# Patient Record
Sex: FEMALE | Race: BLACK OR AFRICAN AMERICAN | NOT HISPANIC OR LATINO | Employment: OTHER | URBAN - METROPOLITAN AREA
[De-identification: names, ages, dates, MRNs, and addresses within clinical notes are randomized per-mention and may not be internally consistent; named-entity substitution may affect disease eponyms.]

---

## 2019-05-24 PROBLEM — H40.1131 PRIMARY OPEN ANGLE GLAUCOMA OF BOTH EYES, MILD STAGE: Status: ACTIVE | Noted: 2019-05-24

## 2019-05-29 PROBLEM — G47.33 OSA (OBSTRUCTIVE SLEEP APNEA): Status: ACTIVE | Noted: 2019-05-29

## 2019-05-29 PROBLEM — Z91.199 POOR COMPLIANCE WITH CPAP TREATMENT: Status: ACTIVE | Noted: 2019-05-29

## 2019-05-29 PROBLEM — L40.50 PSORIATIC ARTHRITIS: Status: ACTIVE | Noted: 2019-05-29

## 2019-06-11 PROBLEM — I50.32 CHRONIC DIASTOLIC CONGESTIVE HEART FAILURE: Status: ACTIVE | Noted: 2019-06-11

## 2019-06-11 PROBLEM — B37.31 YEAST VAGINITIS: Status: ACTIVE | Noted: 2019-06-11

## 2019-06-11 PROBLEM — I25.118 CORONARY ARTERY DISEASE OF NATIVE ARTERY OF NATIVE HEART WITH STABLE ANGINA PECTORIS: Status: ACTIVE | Noted: 2019-06-11

## 2019-06-25 PROBLEM — L02.419 CELLULITIS AND ABSCESS OF LOWER EXTREMITY: Status: ACTIVE | Noted: 2019-06-25

## 2019-06-25 PROBLEM — L03.119 CELLULITIS AND ABSCESS OF LOWER EXTREMITY: Status: ACTIVE | Noted: 2019-06-25

## 2019-06-25 PROBLEM — I10 ESSENTIAL HYPERTENSION: Status: ACTIVE | Noted: 2019-06-25

## 2019-10-15 PROBLEM — M54.50 CHRONIC BILATERAL LOW BACK PAIN WITHOUT SCIATICA: Status: ACTIVE | Noted: 2019-10-15

## 2019-10-15 PROBLEM — G89.29 CHRONIC BILATERAL LOW BACK PAIN WITHOUT SCIATICA: Status: ACTIVE | Noted: 2019-10-15

## 2019-11-07 PROBLEM — N18.30 CKD (CHRONIC KIDNEY DISEASE), STAGE III: Status: ACTIVE | Noted: 2019-11-07

## 2019-11-08 ENCOUNTER — TELEPHONE (OUTPATIENT)
Dept: PHARMACY | Facility: CLINIC | Age: 64
End: 2019-11-08

## 2019-11-08 NOTE — TELEPHONE ENCOUNTER
LVM for callback to inform patient that [facility] received prescription for COSENTYX and prior authorization is required.  [facility] will be back in touch once insurance determination is received.

## 2019-11-14 NOTE — TELEPHONE ENCOUNTER
DOCUMENTATION ONLY:  Prior authorization for COSENTYX approved from 11/14/2019 to 12/31/2020.   Case ID# [id number]    Co-pay: $0    Patient Assistance IS required.     Forward to clinical pharmacist for consult & shipment.

## 2019-11-21 NOTE — TELEPHONE ENCOUNTER
Initial Cosentyx SensoReady Pens 150mg consult completed on . Cosentyx SensoReady Pens 150mg will be shipped on  to arrive at patient's home on  via [employer]. $0 copay. Patient will start Cosentyx SensoReady Pens 150mg next week after receiving shipment and feeling better. Address confirmed, CC on file. Confirmed 2 patient identifiers - name and . Therapy Appropriate.    - Cosentyx SensoReady Pens 150mg:  Inject 1 pen (150mg) every week x 5 weeks, then 1 pen (150mg) every 4 weeks.    -Storage: Refrigerate between 36-46 degrees Fahrenheit  Use within ONE HOUR of taking out of fridge.    -Injection technique:  - Wash hands before and after injection.  - Monthly RX will come with gauze, bandaids, and alcohol swabs.  - Patient may inject in either the tops of the thighs, abdomen- but at least 2 inches away from her belly button, or the outer part of her upper arm. Patient was instructed to rotate injections sites.  - Examine device to ensure no particulates, cloudiness, etc.  - Patient is to wipe down the injection site with the alcohol pad, wait to dry.  Gently squeeze the area of the cleaned skin and hold it firmly. Place the pen flat at a 90 degree angle against the raised area of skin that is being squeezed, and then push down firmly on the pen to start the injection. The 1st 'click' indicates the start and the second 'click' indicates that the injection is almost complete. A green indicator will fill the window when completed, and pen can be removed.  - Patient will use sharps container; once full, per [place] law, she may lock the sharps container and place in her trash. She can then contact the Pharmacy and we will replace the sharps at no additional charge.    -Potential Side effects:  Injection site reactions, diarrhea, cold like symptoms.  Patient advised to call if any signs of allergic reaction, infection, or use of live vaccines.    -DDI: Medication list reviewed and potential DDIs  addressed.  Patient verbalized understanding. Compliance stressed. Patient advised to keep a calendar marking dates of injections to ensure better compliance. Patient advised to call myself or provider should any questions arise. Patient plans to start Cosentyx SensoReady Pens next week after receiving shipment from [facility]. Consultation included: indication; goals of treatment; administration; storage and handling; side effects; how to handle side effects; the importance of compliance; how to handle missed doses; the importance of laboratory monitoring; the importance of keeping all follow up appointments. Patient understands to report any medication changes to [facility] and provider. All questions answered and addressed to patients satisfaction. I will f/u with her in 1 week from start, [facility] to contact patient in 3 weeks for refills.    Susie Torres, PharmD  [facility]  [phone]

## 2019-11-22 PROBLEM — H40.1132 PRIMARY OPEN ANGLE GLAUCOMA OF BOTH EYES, MODERATE STAGE: Status: ACTIVE | Noted: 2019-05-24

## 2019-12-19 ENCOUNTER — TELEPHONE (OUTPATIENT)
Dept: PHARMACY | Facility: CLINIC | Age: 64
End: 2019-12-19

## 2020-01-31 ENCOUNTER — TELEPHONE (OUTPATIENT)
Dept: PHARMACY | Facility: CLINIC | Age: 65
End: 2020-01-31

## 2020-02-28 ENCOUNTER — TELEPHONE (OUTPATIENT)
Dept: PHARMACY | Facility: CLINIC | Age: 65
End: 2020-02-28

## 2020-03-13 NOTE — TELEPHONE ENCOUNTER
DOCUMENTATION ONLY:  Prior authorization for Cosentyx approved from 03/12/20 to 12/31/20    Co-pay: $3.90    Patient Assistance IS NOT

## 2020-03-26 ENCOUNTER — TELEPHONE (OUTPATIENT)
Dept: PHARMACY | Facility: CLINIC | Age: 65
End: 2020-03-26

## 2020-04-28 ENCOUNTER — TELEPHONE (OUTPATIENT)
Dept: PHARMACY | Facility: CLINIC | Age: 65
End: 2020-04-28

## 2020-04-30 NOTE — TELEPHONE ENCOUNTER
RX call attempt 2 regarding Cosentyx refill from [facility]. Patient was not reached, left voicemail. $0 copay. RBA

## 2020-05-20 PROBLEM — B35.1 FUNGAL TOENAIL INFECTION: Status: ACTIVE | Noted: 2020-05-20

## 2020-05-21 ENCOUNTER — TELEPHONE (OUTPATIENT)
Dept: PHARMACY | Facility: CLINIC | Age: 65
End: 2020-05-21

## 2020-05-21 NOTE — TELEPHONE ENCOUNTER
Call attempt 1 LVM 5/21/20 for Cosentyx refill/follow up. Unable to send Asteriskhart message. ($0 copay in 004).

## 2020-05-21 NOTE — TELEPHONE ENCOUNTER
Confirmed Cosentyx shipment  to arrive to patient home . Patient stated that she was advised to continue Cosentyx. She reported that she is to take half of her MTX dose next week and then discontinue MTX. She was advised to continue Arava - confirmed in OV summary. Address and  verified. $0 copay (004).

## 2020-05-22 PROBLEM — E11.3293 MILD NONPROLIFERATIVE DIABETIC RETINOPATHY OF BOTH EYES WITHOUT MACULAR EDEMA ASSOCIATED WITH TYPE 2 DIABETES MELLITUS: Status: ACTIVE | Noted: 2020-05-22

## 2020-06-24 ENCOUNTER — TELEPHONE (OUTPATIENT)
Dept: PHARMACY | Facility: CLINIC | Age: 65
End: 2020-06-24

## 2020-07-07 ENCOUNTER — TELEPHONE (OUTPATIENT)
Dept: PHARMACY | Facility: CLINIC | Age: 65
End: 2020-07-07

## 2020-08-09 PROBLEM — B37.31 YEAST VAGINITIS: Status: RESOLVED | Noted: 2019-06-11 | Resolved: 2020-08-09

## 2020-08-20 ENCOUNTER — TELEPHONE (OUTPATIENT)
Dept: PHARMACY | Facility: CLINIC | Age: 65
End: 2020-08-20

## 2020-08-20 NOTE — TELEPHONE ENCOUNTER
LVM with patient on 8/20 for Cosentyx refill and follow up. $0 copay (004). Unable to send Framed Data

## 2020-08-24 ENCOUNTER — TELEPHONE (OUTPATIENT)
Dept: PHARMACY | Facility: CLINIC | Age: 65
End: 2020-08-24

## 2020-09-08 PROBLEM — L28.0 LICHEN SIMPLEX CHRONICUS: Status: ACTIVE | Noted: 2020-09-08

## 2020-09-19 PROBLEM — I24.9 ACS (ACUTE CORONARY SYNDROME): Status: ACTIVE | Noted: 2020-09-19

## 2020-09-19 PROBLEM — R07.89 OTHER CHEST PAIN: Status: ACTIVE | Noted: 2020-09-19

## 2020-09-19 PROBLEM — I10 ESSENTIAL HYPERTENSION: Status: RESOLVED | Noted: 2019-06-25 | Resolved: 2020-09-19

## 2020-09-19 PROBLEM — R07.89 OTHER CHEST PAIN: Status: RESOLVED | Noted: 2020-09-19 | Resolved: 2020-09-19

## 2020-09-20 PROBLEM — I50.30 HEART FAILURE WITH PRESERVED EJECTION FRACTION: Status: ACTIVE | Noted: 2019-06-11

## 2020-09-20 PROBLEM — E11.9 TYPE 2 DIABETES MELLITUS, WITH LONG-TERM CURRENT USE OF INSULIN: Status: ACTIVE | Noted: 2020-09-20

## 2020-09-20 PROBLEM — N18.30 CKD (CHRONIC KIDNEY DISEASE), STAGE III: Status: RESOLVED | Noted: 2019-11-07 | Resolved: 2020-09-20

## 2020-09-20 PROBLEM — Z79.4 TYPE 2 DIABETES MELLITUS, WITH LONG-TERM CURRENT USE OF INSULIN: Status: ACTIVE | Noted: 2020-09-20

## 2020-09-20 PROBLEM — R07.9 CHEST PAIN: Status: ACTIVE | Noted: 2020-09-19

## 2020-09-24 ENCOUNTER — TELEPHONE (OUTPATIENT)
Dept: PHARMACY | Facility: CLINIC | Age: 65
End: 2020-09-24

## 2020-09-24 NOTE — TELEPHONE ENCOUNTER
Cosentyx refill confirmed. Per provider, patient to inject per usual post PCI procedure. Cosentyx will ship 9/28 for delivery 9/29 $0.00 -004.

## 2020-10-16 ENCOUNTER — TELEPHONE (OUTPATIENT)
Dept: PHARMACY | Facility: CLINIC | Age: 65
End: 2020-10-16

## 2020-12-03 ENCOUNTER — TELEPHONE (OUTPATIENT)
Dept: PHARMACY | Facility: CLINIC | Age: 65
End: 2020-12-03

## 2020-12-09 ENCOUNTER — SPECIALTY PHARMACY (OUTPATIENT)
Dept: PHARMACY | Facility: CLINIC | Age: 65
End: 2020-12-09

## 2020-12-09 NOTE — TELEPHONE ENCOUNTER
"Specialty Pharmacy - Clinical Reassessment  Specialty Pharmacy - Refill Coordination    Specialty Medication Orders Linked to Encounter      Most Recent Value   Medication #1  secukinumab (COSENTYX PEN) 150 mg/mL PnIj (Order#[id number], Rx#[id number])        Moses Arnold is a 65 y.o. female, who is followed by the specialty pharmacy service for management and education.    Recent Encounters     Date Type Provider Description    12/09/2020 Specialty Pharmacy Jazzmine Brantley PharmD Follow-up Clinical Reassessment; Refill Coordination        Clinical call attempts since last clinical assessment   No call attempts found.     Today she received follow up education for her specialty medication(s).    Current Outpatient Medications   Medication Sig    amitriptyline (ELAVIL) 50 MG tablet TK 1 T PO NIGHTLY    amLODIPine (NORVASC) 10 MG tablet Take 1 tablet (10 mg total) by mouth once daily.    ammonium lactate 12 % Crea APPLY EVERY DAY TO FEET    aspirin (ECOTRIN) 81 MG EC tablet Take 1 tablet (81 mg total) by mouth once daily.    atorvastatin (LIPITOR) 80 MG tablet Take 1 tablet (80 mg total) by mouth every evening.    BD ULTRA-FINE SHORT PEN NEEDLE 31 gauge x 5/16" Ndle U UTD    blood sugar diagnostic (ACCU-CHEK TALI PLUS TEST STRP) Strp 1 strip by Misc.(Non-Drug; Combo Route) route 2 (two) times daily before meals. Dx Code E 11.9   DXT BID   IDDM  Accu-Chek Tali Pluse Test Strips    blood-glucose meter (FREESTYLE SYSTEM KIT) kit Use as instructed    carvediloL (COREG) 12.5 MG tablet Take 1 tablet (12.5 mg total) by mouth 2 (two) times daily with meals.    ciclopirox (PENLAC) 8 % Soln     clopidogreL (PLAVIX) 75 mg tablet Take 1 tablet (75 mg total) by mouth once daily.    folic acid (FOLVITE) 1 MG tablet Take 1 tablet (1 mg total) by mouth once daily.    furosemide (LASIX) 40 MG tablet Take 1 tablet (40 mg total) by mouth once daily.    gabapentin (NEURONTIN) 300 MG capsule Take 1 " capsule (300 mg total) by mouth 3 (three) times daily.    insulin glargine (LANTUS U-100 INSULIN) 100 unit/mL injection Inject 58 Units into the skin every evening.    insulin regular hum U-500 conc 500 unit/mL (3 mL) InPn Inject 15 Units into the skin 3 (three) times daily before meals.    isosorbide mononitrate (IMDUR) 30 MG 24 hr tablet Take 3 tablets (90 mg total) by mouth once daily.    leflunomide (ARAVA) 10 MG Tab Take 1 tablet (10 mg total) by mouth once daily. TK 1 T PO D    leflunomide (ARAVA) 20 MG Tab     lisinopriL (PRINIVIL,ZESTRIL) 40 MG tablet Take 1 tablet (40 mg total) by mouth once daily.    metFORMIN (GLUCOPHAGE XR) 500 MG XR 24hr tablet Take 1 tablet (500 mg total) by mouth once daily.    nitroGLYCERIN (NITROSTAT) 0.4 MG SL tablet Place 1 tablet (0.4 mg total) under the tongue every 5 (five) minutes as needed for Chest pain.    secukinumab (COSENTYX PEN) 150 mg/mL PnIj Inject 150 mg into the skin every 28 days.    terbinafine HCL (LAMISIL) 250 mg tablet     timolol maleate 0.5% (TIMOPTIC) 0.5 % Drop Place 1 drop into both eyes 2 (two) times daily.    traMADol (ULTRAM) 50 mg tablet Take 1 tablet (50 mg total) by mouth every 12 (twelve) hours as needed for Pain.    triamcinolone (KENALOG) 0.5 % ointment Apply topically 3 (three) times daily.   Last reviewed on 12/8/2020  9:37 AM by Dora Fay MD    Review of patient's allergies indicates:  No Known AllergiesLast reviewed on  12/9/2020 9:47 AM by Jazzmine Brantley    Drug Interactions    Drug interactions evaluated: yes  Clinically relevant drug interactions identified: no  Provided the patient with educational material regarding drug interactions: not applicable       Medication Adherence    Patient reported X missed doses in the last month: 0  Any gaps in refill history greater than 2 weeks in the last 3 months: no  Informant: patient  Reliability of informant: reliable  Provider-estimated medication adherence level:  "%  Reasons for non-adherence: no problems identified  Adherence tools used: calendar  Confirmed plan for next specialty medication refill: delivery by pharmacy  Refills needed for supportive medications: not needed       Adverse Effects    *All other systems reviewed and are negative       Assessment Questions - Documented Responses      Most Recent Value   Assessment   Medication Reconciliation completed for patient  Yes   During the past 4 weeks, has patient missed any activities due to condition or medication?  No   During the past 4 weeks, did patient have any of the following urgent care visits?  None   Goals of Therapy Status  Achieving   Welcome packet contents reviewed and discussed with patient?  No   Assesment completed?  Yes   Plan  Therapy continued   Do you need to open a clinical intervention (i-vent)?  No   Do you want to schedule first shipment?  No   Medication #1 Assessment Info   Patient status  Existing medication, Exisiting to [facility]   Is this medication appropriate for the patient?  Yes   Is this medication effective?  Yes        Objective    She has a past medical history of Cataract, Diabetes mellitus, Glaucoma, and Mild nonproliferative diabetic retinopathy of both eyes without macular edema associated with type 2 diabetes mellitus (5/22/2020).    Tried/failed medications: Humira, Enbrel, MTX, Arava, topicals    BP Readings from Last 4 Encounters:   12/08/20 112/69   11/06/20 (!) 144/66   10/14/20 128/68   09/22/20 124/66     Ht Readings from Last 4 Encounters:   12/08/20 5' 6" (1.676 m)   11/06/20 5' 6" (1.676 m)   10/14/20 5' 6" (1.676 m)   09/21/20 5' 6" (1.676 m)     Wt Readings from Last 4 Encounters:   12/08/20 115.7 kg (255 lb)   11/06/20 111.6 kg (246 lb 1.6 oz)   10/14/20 114 kg (251 lb 6.4 oz)   09/21/20 108.9 kg (240 lb)     Recent Labs   Lab Result Units 12/08/20  1019 09/22/20  0734 09/21/20  0120 09/20/20  0440 09/19/20  1356   Creatinine mg/dL 1.50 H 1.20 1.30 1.10 1.60 H "   ALT U/L 11  --   --   --  44   AST U/L 15  --   --   --  17     The goals of prescribed drug therapy management include:  · Supporting patient to meet the prescriber's medical treatment objectives  · Improving or maintaining quality of life  · Maintaining optimal therapy adherence  · Minimizing and managing side effects    Goals of Therapy Status: Achieving    Assessment/Plan  Patient plans to continue therapy without changes    Indication, dosage, appropriateness, effectiveness, safety and convenience of her specialty medication(s) were reviewed today.     Patient Counseling    Counseled the patient on the following: doses and administration discussed, safe handling, storage, and disposal discussed, possible adverse effects and management discussed, possible drug and prescription drug interactions discussed, possible drug and OTC drug and food interactions discussed, lab monitoring and follow-up discussed, use of contraception discussed, therapeutic rationale discussed, cost of medications and cost implications discussed, adherence and missed doses discussed, pharmacy contact information discussed       Cosentyx reassessment complete. Next injection due 12/10 - pt delayed most recent injection due to no refills and MD appt. Will ship 12/9 to arrive 12/10 via [employer]. Copay $0, patient is not in need of a sharps container. Pt is very comfortable with the injection process. She  injects in her thighs and rotates injection sites. Pt denies missed doses and uses a calendar to remember injection dates. Pt stores in the refrigerator and reminded that Cosentyx is only stable at RT for 1 hr. Pt denies side effects and reminded to hold medication if ill. Patient declined full medication reconciliation as it was completed at [facility] yesterday 12/8/20. Drug interaction check revealed no clinically significant DDI's identified. Comorbidities reviewed. DM - patient states she takes her BG daily and it typically runs around 120.  Patient states that she is stable on insulin and metformin, most recent a1c 9.1% 9/21/20 patient advised of importance of keep diabetes controlled. HTN - patient states that her BP has been very well controlled on both amlodipine and lisinopril.  Allergies reviewed. Labs reviewed (12/8/20) - CBC stable, LFTs WNL, TB/Hep B negative 9/19/20 . Therapy appropriate to continue. PsA symptoms reviewed - patient states that Cosentyx works wonderfully for her. She has noticed a huge improvement and at this point denied any joint pain or symptoms associated with her PsA.  Pt denies any missed plans or trips to the ER/urgent care in the last month. Pt rates pain at 0/10 and overall health at 6.5/10 (10 being the best). Next appt 7/6/21 pt advised to keep up with all labs an appts. [facility] will follow up in 180 days - patient has been stable on therapy for over 1 year. Pt had no further questions.     Tasks added this encounter   5/31/2021 - Clinical - Follow Up Assesement (180 day)  12/31/2020 - Refill Call (Auto Added)   Tasks due within next 3 months   No tasks due.     Jazzmine Brantley, PharmD  [facility]  [address] [place] [address]  Phone: [phone]  Fax: [phone]

## 2021-01-22 ENCOUNTER — SPECIALTY PHARMACY (OUTPATIENT)
Dept: PHARMACY | Facility: CLINIC | Age: 66
End: 2021-01-22

## 2021-05-05 ENCOUNTER — PATIENT OUTREACH (OUTPATIENT)
Dept: ADMINISTRATIVE | Facility: HOSPITAL | Age: 66
End: 2021-05-05

## 2021-05-05 DIAGNOSIS — E11.65 TYPE 2 DIABETES MELLITUS WITH HYPERGLYCEMIA, WITH LONG-TERM CURRENT USE OF INSULIN: ICD-10-CM

## 2021-05-05 DIAGNOSIS — Z79.4 TYPE 2 DIABETES MELLITUS WITH HYPERGLYCEMIA, WITH LONG-TERM CURRENT USE OF INSULIN: ICD-10-CM

## 2021-05-05 DIAGNOSIS — Z12.31 SCREENING MAMMOGRAM, ENCOUNTER FOR: ICD-10-CM

## 2021-05-05 DIAGNOSIS — Z12.31 ENCOUNTER FOR MAMMOGRAM TO ESTABLISH BASELINE MAMMOGRAM: Primary | ICD-10-CM

## 2021-07-07 ENCOUNTER — SPECIALTY PHARMACY (OUTPATIENT)
Dept: PHARMACY | Facility: CLINIC | Age: 66
End: 2021-07-07

## 2021-07-08 ENCOUNTER — SPECIALTY PHARMACY (OUTPATIENT)
Dept: PHARMACY | Facility: CLINIC | Age: 66
End: 2021-07-08

## 2021-07-08 DIAGNOSIS — L40.50 PSORIATIC ARTHRITIS: Primary | ICD-10-CM

## 2021-07-29 ENCOUNTER — SPECIALTY PHARMACY (OUTPATIENT)
Dept: PHARMACY | Facility: CLINIC | Age: 66
End: 2021-07-29

## 2021-08-27 ENCOUNTER — SPECIALTY PHARMACY (OUTPATIENT)
Dept: PHARMACY | Facility: CLINIC | Age: 66
End: 2021-08-27

## 2021-10-07 ENCOUNTER — SPECIALTY PHARMACY (OUTPATIENT)
Dept: PHARMACY | Facility: CLINIC | Age: 66
End: 2021-10-07

## 2021-11-03 PROBLEM — M47.816 LUMBAR SPONDYLOSIS: Status: ACTIVE | Noted: 2021-11-03

## 2021-11-10 ENCOUNTER — SPECIALTY PHARMACY (OUTPATIENT)
Dept: PHARMACY | Facility: CLINIC | Age: 66
End: 2021-11-10

## 2021-12-08 ENCOUNTER — SPECIALTY PHARMACY (OUTPATIENT)
Dept: PHARMACY | Facility: CLINIC | Age: 66
End: 2021-12-08

## 2022-01-04 ENCOUNTER — SPECIALTY PHARMACY (OUTPATIENT)
Dept: PHARMACY | Facility: CLINIC | Age: 67
End: 2022-01-04

## 2022-01-04 PROBLEM — E66.01 SEVERE OBESITY (BMI >= 40): Status: ACTIVE | Noted: 2022-01-04

## 2022-01-04 PROBLEM — E87.5 HYPERKALEMIA: Status: ACTIVE | Noted: 2022-01-04

## 2022-01-04 PROBLEM — E11.65 UNCONTROLLED TYPE 2 DIABETES MELLITUS WITH HYPERGLYCEMIA: Status: ACTIVE | Noted: 2022-01-04

## 2022-01-04 NOTE — TELEPHONE ENCOUNTER
Specialty Pharmacy - Refill Coordination    Specialty Medication Orders Linked to Encounter    Flowsheet Row Most Recent Value   Medication #1 secukinumab (COSENTYX PEN) 150 mg/mL PnIj (Order#[id number], Rx#[id number])          Refill Questions - Documented Responses    Flowsheet Row Most Recent Value   Patient Availability and HIPAA Verification    Does patient want to proceed with activity? Yes   HIPAA/medical authority confirmed? Yes   Relationship to patient of person spoken to? Self   Refill Screening Questions    Changes to allergies? No   Changes to medications? No   New conditions since last clinic visit? No   Unplanned office visit, urgent care, ED, or hospital admission in the last 4 weeks? No   How does patient/caregiver feel medication is working? Excellent   Financial problems or insurance changes? No   How many doses of your specialty medications were missed in the last 4 weeks? 0   Would patient like to speak to a pharmacist? No   When does the patient need to receive the medication? 01/11/22   Refill Delivery Questions    How will the patient receive the medication? Mail   When does the patient need to receive the medication? 01/11/22   Shipping Address Home   Address in [facility] confirmed and updated if neccessary? Yes   Expected Copay ($) 4   Is the patient able to afford the medication copay? Yes   Payment Method CC on file   Days supply of Refill 28   Supplies needed? No supplies needed   Refill activity completed? Yes   Refill activity plan Refill scheduled   Shipment/Pickup Date: 01/06/22          Current Outpatient Medications   Medication Sig    ACCU-CHEK FRIEDA PLUS METER Misc Inject 1 each into the skin 2 (two) times a day.    amitriptyline (ELAVIL) 50 MG tablet TK 1 T PO NIGHTLY    ammonium lactate 12 % Crea APPLY EVERY DAY TO FEET    aspirin (ECOTRIN) 81 MG EC tablet Take 1 tablet (81 mg total) by mouth once daily.    atorvastatin (LIPITOR) 80 MG tablet Take 1 tablet (80 mg  "total) by mouth every evening.    BD ULTRA-FINE SHORT PEN NEEDLE 31 gauge x 5/16" Ndle U UTD    blood sugar diagnostic (ACCU-CHEK FRIEDA PLUS TEST STRP) Strp 1 strip by Misc.(Non-Drug; Combo Route) route 2 (two) times daily before meals. Dx Code E 11.9   DXT BID   IDDM  Accu-Chek Frieda Pluse Test Strips    blood sugar diagnostic Strp 1 each by Misc.(Non-Drug; Combo Route) route 4 (four) times daily.    carvediloL (COREG) 12.5 MG tablet Take 2 tablets (25 mg total) by mouth 2 (two) times daily with meals.    clopidogreL (PLAVIX) 75 mg tablet Take 1 tablet (75 mg total) by mouth once daily.    diclofenac sodium (VOLTAREN) 1 % Gel Apply 2 g topically once daily.    DULoxetine (CYMBALTA) 30 MG capsule Take 1 capsule (30 mg total) by mouth once daily.    furosemide (LASIX) 40 MG tablet Take 1 tablet (40 mg total) by mouth once daily.    gabapentin (NEURONTIN) 300 MG capsule Take 1 capsule (300 mg total) by mouth 3 (three) times daily.    HUMULIN R U-500, CONC, INSULIN 500 unit/mL Soln     insulin glargine (LANTUS U-100 INSULIN) 100 unit/mL injection Inject 60 Units into the skin every evening.    insulin regular hum U-500 conc (HUMULIN U-500) 500 unit/mL (3 mL) insulin pen Inject 15 Units into the skin 3 (three) times daily before meals.    isosorbide mononitrate (IMDUR) 30 MG 24 hr tablet Take 3 tablets (90 mg total) by mouth once daily.    lancing device Misc 1 each by Misc.(Non-Drug; Combo Route) route 4 (four) times daily.    lisinopriL (PRINIVIL,ZESTRIL) 40 MG tablet Take 1 tablet (40 mg total) by mouth once daily.    MICRO THIN LANCETS 33 gauge Misc Inject 300 lancets into the skin 2 (two) times a day.    NIFEdipine (PROCARDIA-XL) 60 MG (OSM) 24 hr tablet Take 1 tablet (60 mg total) by mouth 2 (two) times a day.    nitroGLYCERIN (NITROSTAT) 0.4 MG SL tablet Place 1 tablet (0.4 mg total) under the tongue every 5 (five) minutes as needed for Chest pain.    secukinumab (COSENTYX PEN) 150 mg/mL EricIj " Inject 150 mg into the skin every 28 days.    SUPER THIN LANCETS 28 gauge Misc USE AS DIRECTED FOUR TIMES DAILY    timolol maleate 0.5% (TIMOPTIC) 0.5 % Drop Place 1 drop into both eyes 2 (two) times daily.    triamcinolone (KENALOG) 0.5 % ointment Apply topically 3 (three) times daily.   Last reviewed on 1/4/2022  9:33 AM by Kristin Beck MA    Review of patient's allergies indicates:  No Known Allergies Last reviewed on  1/4/2022 9:32 AM by Kristin Beck      Tasks added this encounter   No tasks added.   Tasks due within next 3 months   1/4/2022 - Refill Call (Auto Added)     Al PharmD  [facility]  [address] [place], [place] [address]  [facility] [address]  Phone: [phone]  Fax: [phone]

## 2022-01-06 PROBLEM — E87.5 HYPERKALEMIA: Status: RESOLVED | Noted: 2022-01-04 | Resolved: 2022-01-06

## 2022-01-22 PROBLEM — M48.061 SPINAL STENOSIS OF LUMBAR REGION WITHOUT NEUROGENIC CLAUDICATION: Status: ACTIVE | Noted: 2022-01-22

## 2022-02-02 ENCOUNTER — SPECIALTY PHARMACY (OUTPATIENT)
Dept: PHARMACY | Facility: CLINIC | Age: 67
End: 2022-02-02

## 2022-02-02 NOTE — TELEPHONE ENCOUNTER
Specialty Pharmacy - Refill Coordination    Specialty Medication Orders Linked to Encounter    Flowsheet Row Most Recent Value   Medication #1 secukinumab (COSENTYX PEN) 150 mg/mL PnIj (Order#[id number], Rx#[id number])          Refill Questions - Documented Responses    Flowsheet Row Most Recent Value   Patient Availability and HIPAA Verification    Does patient want to proceed with activity? Yes   HIPAA/medical authority confirmed? Yes   Relationship to patient of person spoken to? Self   Refill Screening Questions    Changes to allergies? No   Changes to medications? No   New conditions since last clinic visit? No   Unplanned office visit, urgent care, ED, or hospital admission in the last 4 weeks? No   How does patient/caregiver feel medication is working? Good   Financial problems or insurance changes? No   How many doses of your specialty medications were missed in the last 4 weeks? 0   Would patient like to speak to a pharmacist? No   When does the patient need to receive the medication? 02/11/22   Refill Delivery Questions    How will the patient receive the medication? Mail   When does the patient need to receive the medication? 02/11/22   Shipping Address Home   Address in [facility] confirmed and updated if neccessary? Yes   Expected Copay ($) 4   Is the patient able to afford the medication copay? Yes   Payment Method CC on file   Days supply of Refill 28   Supplies needed? No supplies needed   Refill activity completed? Yes   Refill activity plan Refill scheduled   Shipment/Pickup Date: 02/09/22          Current Outpatient Medications   Medication Sig    ACCU-CHEK FRIEDA PLUS METER Misc Inject 1 each into the skin 2 (two) times a day.    acetaminophen (TYLENOL) 325 MG tablet Take 2 tablets (650 mg total) by mouth every 6 (six) hours as needed.    amitriptyline (ELAVIL) 50 MG tablet TK 1 T PO NIGHTLY    ammonium lactate 12 % Crea APPLY EVERY DAY TO FEET    aspirin (ECOTRIN) 81 MG EC tablet Take  "1 tablet (81 mg total) by mouth once daily.    atorvastatin (LIPITOR) 80 MG tablet Take 1 tablet (80 mg total) by mouth every evening.    BD ULTRA-FINE SHORT PEN NEEDLE 31 gauge x 5/16" Ndle U UTD    blood sugar diagnostic (ACCU-CHEK FRIEDA PLUS TEST STRP) Strp 1 strip by Misc.(Non-Drug; Combo Route) route 2 (two) times daily before meals. Dx Code E 11.9   DXT BID   IDDM  Accu-Chek Frieda Pluse Test Strips    blood sugar diagnostic Strp 1 each by Misc.(Non-Drug; Combo Route) route 4 (four) times daily.    carvediloL (COREG) 12.5 MG tablet Take 2 tablets (25 mg total) by mouth 2 (two) times daily with meals.    clopidogreL (PLAVIX) 75 mg tablet Take 1 tablet (75 mg total) by mouth once daily.    diclofenac sodium (VOLTAREN) 1 % Gel Apply 2 g topically once daily.    DULoxetine (CYMBALTA) 30 MG capsule Take 1 capsule (30 mg total) by mouth once daily.    furosemide (LASIX) 40 MG tablet Take 1 tablet (40 mg total) by mouth once daily.    gabapentin (NEURONTIN) 300 MG capsule Take 1 capsule (300 mg total) by mouth 3 (three) times daily.    insulin aspart U-100 (NOVOLOG) 100 unit/mL injection Inject 10 Units into the skin 3 (three) times daily.    insulin glargine (LANTUS U-100 INSULIN) 100 unit/mL injection Inject 30 Units into the skin 2 (two) times daily.    isosorbide mononitrate (IMDUR) 30 MG 24 hr tablet Take 3 tablets (90 mg total) by mouth once daily.    lancing device Misc 1 each by Misc.(Non-Drug; Combo Route) route 4 (four) times daily.    LIDOcaine (LIDODERM) 5 % Place 1 patch onto the skin once daily. Remove & Discard patch within 12 hours or as directed by MD    lisinopriL (PRINIVIL,ZESTRIL) 40 MG tablet Take 1 tablet (40 mg total) by mouth once daily.    MICRO THIN LANCETS 33 gauge Misc Inject 300 lancets into the skin 2 (two) times a day.    nitroGLYCERIN (NITROSTAT) 0.4 MG SL tablet Place 1 tablet (0.4 mg total) under the tongue every 5 (five) minutes as needed for Chest pain.    " secukinumab (COSENTYX PEN) 150 mg/mL PnIj Inject 150 mg into the skin every 28 days.    SUPER THIN LANCETS 28 gauge Misc USE AS DIRECTED FOUR TIMES DAILY    timolol maleate 0.5% (TIMOPTIC) 0.5 % Drop Place 1 drop into both eyes 2 (two) times daily.    triamcinolone (KENALOG) 0.5 % ointment Apply topically 3 (three) times daily.   Last reviewed on 1/22/2022 10:05 AM by La Fowler MA    Review of patient's allergies indicates:  No Known Allergies Last reviewed on  1/22/2022 10:05 AM by La Fowler      Tasks added this encounter   No tasks added.   Tasks due within next 3 months   2/1/2022 - Refill Call (Auto Added)     [facility]  [address] [place], [place] [address]  [facility] [address]  Phone: [phone]  Fax: [phone]

## 2022-02-18 ENCOUNTER — EXTERNAL HOME HEALTH (OUTPATIENT)
Dept: HOME HEALTH SERVICES | Facility: HOSPITAL | Age: 67
End: 2022-02-18

## 2022-03-04 ENCOUNTER — SPECIALTY PHARMACY (OUTPATIENT)
Dept: PHARMACY | Facility: CLINIC | Age: 67
End: 2022-03-04

## 2022-03-04 NOTE — TELEPHONE ENCOUNTER
Specialty Pharmacy - Refill Coordination    Specialty Medication Orders Linked to Encounter    Flowsheet Row Most Recent Value   Medication #1 secukinumab (COSENTYX PEN) 150 mg/mL PnIj (Order#[id number], Rx#[id number])          Refill Questions - Documented Responses    Flowsheet Row Most Recent Value   Patient Availability and HIPAA Verification    Does patient want to proceed with activity? Yes   HIPAA/medical authority confirmed? Yes   Relationship to patient of person spoken to? Self   Refill Screening Questions    Changes to allergies? No   Changes to medications? No   New conditions since last clinic visit? No   Unplanned office visit, urgent care, ED, or hospital admission in the last 4 weeks? No   How does patient/caregiver feel medication is working? Good   Financial problems or insurance changes? No   How many doses of your specialty medications were missed in the last 4 weeks? 0   Would patient like to speak to a pharmacist? No   When does the patient need to receive the medication? 03/11/22   Refill Delivery Questions    How will the patient receive the medication? Mail   When does the patient need to receive the medication? 03/11/22   Shipping Address Home   Address in [facility] confirmed and updated if neccessary? Yes   Expected Copay ($) 0   Is the patient able to afford the medication copay? Yes   Payment Method zero copay   Days supply of Refill 28   Supplies needed? No supplies needed   Refill activity completed? Yes   Refill activity plan Refill scheduled   Shipment/Pickup Date: 03/08/22          Current Outpatient Medications   Medication Sig    ACCU-CHEK FRIEDA PLUS METER Misc Inject 1 each into the skin 2 (two) times a day.    acetaminophen (TYLENOL) 325 MG tablet Take 2 tablets (650 mg total) by mouth every 6 (six) hours as needed.    amitriptyline (ELAVIL) 50 MG tablet TK 1 T PO NIGHTLY    ammonium lactate 12 % Crea APPLY EVERY DAY TO FEET    aspirin (ECOTRIN) 81 MG EC tablet Take  "1 tablet (81 mg total) by mouth once daily.    atorvastatin (LIPITOR) 80 MG tablet Take 1 tablet (80 mg total) by mouth every evening.    BD ULTRA-FINE SHORT PEN NEEDLE 31 gauge x 5/16" Ndle U UTD    blood sugar diagnostic (ACCU-CHEK FRIEDA PLUS TEST STRP) Strp 1 strip by Misc.(Non-Drug; Combo Route) route 2 (two) times daily before meals. Dx Code E 11.9   DXT BID   IDDM  Accu-Chek Frieda Pluse Test Strips    blood sugar diagnostic Strp 1 each by Misc.(Non-Drug; Combo Route) route 4 (four) times daily.    carvediloL (COREG) 12.5 MG tablet Take 2 tablets (25 mg total) by mouth 2 (two) times daily with meals.    clopidogreL (PLAVIX) 75 mg tablet Take 1 tablet (75 mg total) by mouth once daily.    diclofenac sodium (VOLTAREN) 1 % Gel Apply 2 g topically once daily.    DULoxetine (CYMBALTA) 30 MG capsule Take 1 capsule (30 mg total) by mouth once daily.    furosemide (LASIX) 40 MG tablet Take 1 tablet (40 mg total) by mouth once daily.    gabapentin (NEURONTIN) 300 MG capsule Take 1 capsule (300 mg total) by mouth 3 (three) times daily.    HUMULIN R U-500, CONC, KWIKPEN 500 unit/mL (3 mL) insulin pen     insulin aspart U-100 (NOVOLOG) 100 unit/mL injection Inject 10 Units into the skin 3 (three) times daily.    insulin glargine (LANTUS U-100 INSULIN) 100 unit/mL injection Inject 30 Units into the skin 2 (two) times daily.    isosorbide mononitrate (IMDUR) 30 MG 24 hr tablet Take 3 tablets (90 mg total) by mouth once daily.    lancing device Misc 1 each by Misc.(Non-Drug; Combo Route) route 4 (four) times daily.    LIDOcaine (LIDODERM) 5 % Place 1 patch onto the skin once daily. Remove & Discard patch within 12 hours or as directed by MD    lisinopriL (PRINIVIL,ZESTRIL) 40 MG tablet Take 1 tablet (40 mg total) by mouth once daily.    MICRO THIN LANCETS 33 gauge Misc Inject 300 lancets into the skin 2 (two) times a day.    nitroGLYCERIN (NITROSTAT) 0.4 MG SL tablet Place 1 tablet (0.4 mg total) under the " tongue every 5 (five) minutes as needed for Chest pain.    secukinumab (COSENTYX PEN) 150 mg/mL PnIj Inject 150 mg into the skin every 28 days.    SUPER THIN LANCETS 28 gauge Misc USE AS DIRECTED FOUR TIMES DAILY    timolol maleate 0.5% (TIMOPTIC) 0.5 % Drop Place 1 drop into both eyes 2 (two) times daily.    triamcinolone (KENALOG) 0.5 % ointment Apply topically 3 (three) times daily.   Last reviewed on 3/1/2022  1:09 PM by Mia Traecy MA    Review of patient's allergies indicates:  No Known Allergies Last reviewed on  3/1/2022 1:08 PM by Mia Tracey      Tasks added this encounter   4/1/2022 - Refill Call (Auto Added)   Tasks due within next 3 months   No tasks due.     Phyllis Powell [facility] - [facility]  [address] [place] [address]  Phone: [phone]  Fax: [phone]

## 2022-04-01 ENCOUNTER — SPECIALTY PHARMACY (OUTPATIENT)
Dept: PHARMACY | Facility: CLINIC | Age: 67
End: 2022-04-01

## 2022-04-01 PROBLEM — H04.123 DRY EYE SYNDROME, BILATERAL: Status: ACTIVE | Noted: 2022-04-01

## 2022-04-01 NOTE — TELEPHONE ENCOUNTER
Specialty Pharmacy - Refill Coordination    Specialty Medication Orders Linked to Encounter    Flowsheet Row Most Recent Value   Medication #1 secukinumab (COSENTYX PEN) 150 mg/mL PnIj (Order#[id number], Rx#[id number])          Refill Questions - Documented Responses    Flowsheet Row Most Recent Value   Patient Availability and HIPAA Verification    Does patient want to proceed with activity? Yes   HIPAA/medical authority confirmed? Yes   Relationship to patient of person spoken to? Self   Refill Screening Questions    Changes to allergies? No   Changes to medications? No   New conditions since last clinic visit? No   Unplanned office visit, urgent care, ED, or hospital admission in the last 4 weeks? No   How does patient/caregiver feel medication is working? Good   Financial problems or insurance changes? No   How many doses of your specialty medications were missed in the last 4 weeks? 0   Would patient like to speak to a pharmacist? No   When does the patient need to receive the medication? 04/05/22   Refill Delivery Questions    How will the patient receive the medication? Mail   When does the patient need to receive the medication? 04/05/22   Shipping Address Home   Address in [facility] confirmed and updated if neccessary? Yes   Expected Copay ($) 0   Is the patient able to afford the medication copay? Yes   Payment Method zero copay   Days supply of Refill 28   Supplies needed? No supplies needed   Refill activity completed? Yes   Refill activity plan Refill scheduled   Shipment/Pickup Date: 04/04/22          Current Outpatient Medications   Medication Sig    ACCU-CHEK FRIEDA PLUS METER Misc Inject 1 each into the skin 2 (two) times a day.    acetaminophen (TYLENOL) 325 MG tablet Take 2 tablets (650 mg total) by mouth every 6 (six) hours as needed.    amitriptyline (ELAVIL) 50 MG tablet TK 1 T PO NIGHTLY    ammonium lactate 12 % Crea APPLY EVERY DAY TO FEET    aspirin (ECOTRIN) 81 MG EC tablet Take  "1 tablet (81 mg total) by mouth once daily.    atorvastatin (LIPITOR) 80 MG tablet Take 1 tablet (80 mg total) by mouth every evening.    BD ULTRA-FINE SHORT PEN NEEDLE 31 gauge x 5/16" Ndle U UTD    blood sugar diagnostic (ACCU-CHEK FRIEDA PLUS TEST STRP) Strp 1 strip by Misc.(Non-Drug; Combo Route) route 2 (two) times daily before meals. Dx Code E 11.9   DXT BID   IDDM  Accu-Chek Frieda Pluse Test Strips    blood sugar diagnostic Strp 1 each by Misc.(Non-Drug; Combo Route) route 4 (four) times daily.    carvediloL (COREG) 12.5 MG tablet Take 2 tablets (25 mg total) by mouth 2 (two) times daily with meals.    clopidogreL (PLAVIX) 75 mg tablet Take 1 tablet (75 mg total) by mouth once daily.    diclofenac sodium (VOLTAREN) 1 % Gel Apply 2 g topically once daily.    DULoxetine (CYMBALTA) 30 MG capsule Take 1 capsule (30 mg total) by mouth once daily.    famotidine (PEPCID) 20 MG tablet Take 1 tablet (20 mg total) by mouth 2 (two) times daily.    furosemide (LASIX) 40 MG tablet Take 1 tablet (40 mg total) by mouth once daily.    gabapentin (NEURONTIN) 300 MG capsule Take 1 capsule (300 mg total) by mouth 3 (three) times daily.    insulin aspart U-100 (NOVOLOG) 100 unit/mL injection Inject 10 Units into the skin 3 (three) times daily.    insulin glargine (LANTUS U-100 INSULIN) 100 unit/mL injection Inject 25 Units into the skin 2 (two) times daily.    isosorbide mononitrate (IMDUR) 30 MG 24 hr tablet Take 2 tablets (60 mg total) by mouth once daily.    lancing device Misc 1 each by Misc.(Non-Drug; Combo Route) route 4 (four) times daily.    LIDOcaine (LIDODERM) 5 % Place 1 patch onto the skin once daily. Remove & Discard patch within 12 hours or as directed by MD    lisinopriL (PRINIVIL,ZESTRIL) 40 MG tablet Take 1 tablet (40 mg total) by mouth once daily.    MICRO THIN LANCETS 33 gauge Misc Inject 300 lancets into the skin 2 (two) times a day.    NIFEdipine (PROCARDIA XL) 60 MG (OSM) 24 hr tablet Take " 1 tablet (60 mg total) by mouth 2 (two) times a day.    nitroGLYCERIN (NITROSTAT) 0.4 MG SL tablet Place 1 tablet (0.4 mg total) under the tongue every 5 (five) minutes as needed for Chest pain.    secukinumab (COSENTYX PEN) 150 mg/mL PnIj Inject 150 mg into the skin every 28 days.    SUPER THIN LANCETS 28 gauge Misc USE AS DIRECTED FOUR TIMES DAILY    timolol maleate 0.5% (TIMOPTIC) 0.5 % Drop Place 1 drop into both eyes 2 (two) times daily.    triamcinolone (KENALOG) 0.5 % ointment Apply topically 3 (three) times daily.   Last reviewed on 4/1/2022  8:48 AM by Jon Castillo MD    Review of patient's allergies indicates:  No Known Allergies Last reviewed on  4/1/2022 8:48 AM by Jon Castillo      Tasks added this encounter   4/26/2022 - Refill Call (Auto Added)   Tasks due within next 3 months   No tasks due.     Anthony Frances  [facility] - Specialty Pharmacy  [address] [place] [address]  Phone: [phone]  Fax: [phone]

## 2022-04-05 ENCOUNTER — EXTERNAL HOME HEALTH (OUTPATIENT)
Dept: HOME HEALTH SERVICES | Facility: HOSPITAL | Age: 67
End: 2022-04-05

## 2022-04-14 ENCOUNTER — DOCUMENT SCAN (OUTPATIENT)
Dept: HOME HEALTH SERVICES | Facility: HOSPITAL | Age: 67
End: 2022-04-14

## 2022-04-18 ENCOUNTER — DOCUMENT SCAN (OUTPATIENT)
Dept: HOME HEALTH SERVICES | Facility: HOSPITAL | Age: 67
End: 2022-04-18

## 2022-04-29 ENCOUNTER — SPECIALTY PHARMACY (OUTPATIENT)
Dept: PHARMACY | Facility: CLINIC | Age: 67
End: 2022-04-29

## 2022-04-29 NOTE — TELEPHONE ENCOUNTER
Specialty Pharmacy - Refill Coordination    Specialty Medication Orders Linked to Encounter    Flowsheet Row Most Recent Value   Medication #1 secukinumab (COSENTYX PEN) 150 mg/mL PnIj (Order#[id number], Rx#[id number])          Refill Questions - Documented Responses    Flowsheet Row Most Recent Value   Patient Availability and HIPAA Verification    Does patient want to proceed with activity? Yes   HIPAA/medical authority confirmed? Yes   Relationship to patient of person spoken to? Self   Refill Screening Questions    Changes to allergies? No   Changes to medications? No   New conditions since last clinic visit? No   Unplanned office visit, urgent care, ED, or hospital admission in the last 4 weeks? No   How does patient/caregiver feel medication is working? Good   Financial problems or insurance changes? No   How many doses of your specialty medications were missed in the last 4 weeks? 0   Would patient like to speak to a pharmacist? No   When does the patient need to receive the medication? 05/04/22   Refill Delivery Questions    How will the patient receive the medication? Mail   When does the patient need to receive the medication? 05/04/22   Shipping Address Home   Address in [facility] confirmed and updated if neccessary? Yes   Expected Copay ($) 0   Is the patient able to afford the medication copay? Yes   Payment Method zero copay   Days supply of Refill 28   Supplies needed? No supplies needed   Refill activity completed? Yes   Refill activity plan Refill scheduled   Shipment/Pickup Date: 05/03/22          Current Outpatient Medications   Medication Sig    ACCU-CHEK FRIEDA PLUS METER Misc Inject 1 each into the skin 2 (two) times a day.    acetaminophen (TYLENOL) 325 MG tablet Take 2 tablets (650 mg total) by mouth every 6 (six) hours as needed.    ammonium lactate 12 % Crea APPLY EVERY DAY TO FEET    aspirin (ECOTRIN) 81 MG EC tablet Take 1 tablet (81 mg total) by mouth once daily.     "atorvastatin (LIPITOR) 80 MG tablet Take 1 tablet (80 mg total) by mouth every evening.    BD ULTRA-FINE SHORT PEN NEEDLE 31 gauge x 5/16" Ndle U UTD    blood sugar diagnostic (ACCU-CHEK FRIEDA PLUS TEST STRP) Strp 1 strip by Misc.(Non-Drug; Combo Route) route 2 (two) times daily before meals. Dx Code E 11.9   DXT BID   IDDM  Accu-Chek Frieda Pluse Test Strips    blood sugar diagnostic Strp 1 each by Misc.(Non-Drug; Combo Route) route 4 (four) times daily.    carvediloL (COREG) 12.5 MG tablet Take 2 tablets (25 mg total) by mouth 2 (two) times daily with meals.    clopidogreL (PLAVIX) 75 mg tablet Take 1 tablet (75 mg total) by mouth once daily.    diclofenac sodium (VOLTAREN) 1 % Gel Apply 2 g topically once daily.    dulaglutide (TRULICITY) 0.75 mg/0.5 mL pen injector Inject 0.75 mg into the skin every 7 days.    DULoxetine (CYMBALTA) 30 MG capsule Take 1 capsule (30 mg total) by mouth once daily.    famotidine (PEPCID) 20 MG tablet Take 1 tablet (20 mg total) by mouth 2 (two) times daily.    furosemide (LASIX) 40 MG tablet Take 1 tablet (40 mg total) by mouth once daily.    gabapentin (NEURONTIN) 300 MG capsule Take 1 capsule (300 mg total) by mouth 3 (three) times daily.    insulin aspart U-100 (NOVOLOG) 100 unit/mL injection Inject 10 Units into the skin 3 (three) times daily.    insulin glargine (LANTUS U-100 INSULIN) 100 unit/mL injection Inject 25 Units into the skin 2 (two) times daily.    isosorbide mononitrate (IMDUR) 30 MG 24 hr tablet Take 2 tablets (60 mg total) by mouth once daily.    lancing device Misc 1 each by Misc.(Non-Drug; Combo Route) route 4 (four) times daily.    LIDOcaine (LIDODERM) 5 % Place 1 patch onto the skin once daily. Remove & Discard patch within 12 hours or as directed by MD    lisinopriL (PRINIVIL,ZESTRIL) 40 MG tablet Take 1 tablet (40 mg total) by mouth once daily.    MICRO THIN LANCETS 33 gauge Misc Inject 300 lancets into the skin 2 (two) times a day.    " NIFEdipine (PROCARDIA XL) 60 MG (OSM) 24 hr tablet Take 1 tablet (60 mg total) by mouth 2 (two) times a day.    nitroGLYCERIN (NITROSTAT) 0.4 MG SL tablet Place 1 tablet (0.4 mg total) under the tongue every 5 (five) minutes as needed for Chest pain.    secukinumab (COSENTYX PEN) 150 mg/mL PnIj Inject 150 mg into the skin every 28 days.    secukinumab (COSENTYX PEN) 150 mg/mL PnIj Inject 150 mg into the skin every 28 days.    SUPER THIN LANCETS 28 gauge Misc USE AS DIRECTED FOUR TIMES DAILY    timolol maleate 0.5% (TIMOPTIC) 0.5 % Drop Place 1 drop into both eyes 2 (two) times daily.    triamcinolone (KENALOG) 0.5 % ointment Apply topically 3 (three) times daily.   Last reviewed on 4/20/2022 10:38 AM by Kristin Beck MA    Review of patient's allergies indicates:  No Known Allergies Last reviewed on  4/26/2022 10:24 AM by Tamara Fischer      Tasks added this encounter   5/24/2022 - Refill Call (Auto Added)   Tasks due within next 3 months   No tasks due.     Jazzmine Sims, Patient Care Assistant  [facility]  [address]  [facility] [address]  Phone: [phone]  Fax: [phone]

## 2022-05-24 ENCOUNTER — SPECIALTY PHARMACY (OUTPATIENT)
Dept: PHARMACY | Facility: CLINIC | Age: 67
End: 2022-05-24

## 2022-05-24 NOTE — TELEPHONE ENCOUNTER
Specialty Pharmacy - Refill Coordination    Specialty Medication Orders Linked to Encounter    Flowsheet Row Most Recent Value   Medication #1 secukinumab (COSENTYX PEN) 150 mg/mL PnIj (Order#[id number], Rx#[id number])          Refill Questions - Documented Responses    Flowsheet Row Most Recent Value   Patient Availability and HIPAA Verification    Does patient want to proceed with activity? Yes   HIPAA/medical authority confirmed? Yes   Relationship to patient of person spoken to? Self   Refill Screening Questions    Changes to allergies? No   Changes to medications? No   New conditions since last clinic visit? No   Unplanned office visit, urgent care, ED, or hospital admission in the last 4 weeks? No   How does patient/caregiver feel medication is working? Good   Financial problems or insurance changes? No   How many doses of your specialty medications were missed in the last 4 weeks? 0   Would patient like to speak to a pharmacist? No   When does the patient need to receive the medication? 05/31/22   Refill Delivery Questions    How will the patient receive the medication? Mail   When does the patient need to receive the medication? 05/31/22   Shipping Address Home   Address in [facility] confirmed and updated if neccessary? Yes   Expected Copay ($) 0   Is the patient able to afford the medication copay? Yes   Payment Method zero copay   Days supply of Refill 28   Supplies needed? No supplies needed   Refill activity completed? Yes   Refill activity plan Refill scheduled   Shipment/Pickup Date: 05/25/22          Current Outpatient Medications   Medication Sig    ACCU-CHEK FRIEDA PLUS METER Misc Inject 1 each into the skin 2 (two) times a day.    ACCU-CHEK FRIEDA PLUS TEST STRP Strp USE FOUR TIMES DAILY    acetaminophen (TYLENOL) 325 MG tablet Take 2 tablets (650 mg total) by mouth every 6 (six) hours as needed.    ammonium lactate 12 % Crea APPLY EVERY DAY TO FEET    aspirin (ECOTRIN) 81 MG EC tablet  "Take 1 tablet (81 mg total) by mouth once daily.    atorvastatin (LIPITOR) 80 MG tablet Take 1 tablet (80 mg total) by mouth every evening.    BD ULTRA-FINE SHORT PEN NEEDLE 31 gauge x 5/16" Ndle U UTD    blood sugar diagnostic (ACCU-CHEK FRIEDA PLUS TEST STRP) Strp 1 strip by Misc.(Non-Drug; Combo Route) route 2 (two) times daily before meals. Dx Code E 11.9   DXT BID   IDDM  Accu-Chek Frieda Pluse Test Strips    blood sugar diagnostic Strp 1 each by Misc.(Non-Drug; Combo Route) route 4 (four) times daily.    carvediloL (COREG) 12.5 MG tablet Take 2 tablets (25 mg total) by mouth 2 (two) times daily with meals.    clopidogreL (PLAVIX) 75 mg tablet Take 1 tablet (75 mg total) by mouth once daily.    diclofenac sodium (VOLTAREN) 1 % Gel Apply 2 g topically once daily.    dulaglutide (TRULICITY) 0.75 mg/0.5 mL pen injector Inject 0.75 mg into the skin every 7 days.    DULoxetine (CYMBALTA) 30 MG capsule Take 1 capsule (30 mg total) by mouth once daily.    famotidine (PEPCID) 20 MG tablet Take 1 tablet (20 mg total) by mouth 2 (two) times daily.    furosemide (LASIX) 40 MG tablet Take 1 tablet (40 mg total) by mouth once daily.    gabapentin (NEURONTIN) 300 MG capsule Take 1 capsule (300 mg total) by mouth 3 (three) times daily.    insulin aspart U-100 (NOVOLOG) 100 unit/mL injection Inject 10 Units into the skin 3 (three) times daily.    insulin glargine (LANTUS U-100 INSULIN) 100 unit/mL injection Inject 25 Units into the skin 2 (two) times daily.    isosorbide mononitrate (IMDUR) 30 MG 24 hr tablet Take 2 tablets (60 mg total) by mouth once daily.    lancing device Misc 1 each by Misc.(Non-Drug; Combo Route) route 4 (four) times daily.    LIDOcaine (LIDODERM) 5 % Place 1 patch onto the skin once daily. Remove & Discard patch within 12 hours or as directed by MD    lisinopriL (PRINIVIL,ZESTRIL) 40 MG tablet Take 1 tablet (40 mg total) by mouth once daily.    MICRO THIN LANCETS 33 gauge Misc Inject " 300 lancets into the skin 2 (two) times a day.    NIFEdipine (PROCARDIA XL) 60 MG (OSM) 24 hr tablet Take 1 tablet (60 mg total) by mouth 2 (two) times a day.    nitroGLYCERIN (NITROSTAT) 0.4 MG SL tablet Place 1 tablet (0.4 mg total) under the tongue every 5 (five) minutes as needed for Chest pain.    secukinumab (COSENTYX PEN) 150 mg/mL PnIj Inject 150 mg into the skin every 28 days.    secukinumab (COSENTYX PEN) 150 mg/mL PnIj Inject 150 mg into the skin every 28 days.    SUPER THIN LANCETS 28 gauge Misc USE AS DIRECTED FOUR TIMES DAILY    timolol maleate 0.5% (TIMOPTIC) 0.5 % Drop Place 1 drop into both eyes 2 (two) times daily.    triamcinolone (KENALOG) 0.5 % ointment Apply topically 3 (three) times daily.   Last reviewed on 4/20/2022 10:38 AM by Kristin Beck MA    Review of patient's allergies indicates:  No Known Allergies Last reviewed on  4/26/2022 10:24 AM by Tamara Fischer      Tasks added this encounter   6/21/2022 - Refill Call (Auto Added)   Tasks due within next 3 months   No tasks due.     [facility]  [address] [place] [id number]  Phone: [phone]  Fax: [phone]

## 2022-06-08 PROBLEM — L02.419 CELLULITIS AND ABSCESS OF LOWER EXTREMITY: Status: RESOLVED | Noted: 2019-06-25 | Resolved: 2022-06-08

## 2022-06-08 PROBLEM — R07.9 CHEST PAIN: Status: RESOLVED | Noted: 2020-09-19 | Resolved: 2022-06-08

## 2022-06-08 PROBLEM — L03.119 CELLULITIS AND ABSCESS OF LOWER EXTREMITY: Status: RESOLVED | Noted: 2019-06-25 | Resolved: 2022-06-08

## 2022-06-17 ENCOUNTER — SPECIALTY PHARMACY (OUTPATIENT)
Dept: PHARMACY | Facility: CLINIC | Age: 67
End: 2022-06-17

## 2022-06-17 NOTE — TELEPHONE ENCOUNTER
Specialty Pharmacy - Clinical Reassessment    Specialty Medication Orders Linked to Encounter    Flowsheet Row Most Recent Value   Medication #1 secukinumab (COSENTYX PEN) 150 mg/mL PnIj (Order#[id number], Rx#[id number])        Patient Diagnosis   L40.50 - Psoriatic arthritis    Specialty clinical pharmacist review completed for an annual review of reassessment. Reviewed the following areas: current med list, reports of adverse effects, adherence and progress towards therapeutic goals.    Recommendations: none at this time.    Tasks added this encounter   No tasks added.   Tasks due within next 3 months   6/21/2022 - Refill Call (Auto Added)     Sandee Preciado, PharmD  [facility]  [address]  [facility] [address]  Phone: [phone]  Fax: [phone]

## 2022-06-21 ENCOUNTER — SPECIALTY PHARMACY (OUTPATIENT)
Dept: PHARMACY | Facility: CLINIC | Age: 67
End: 2022-06-21

## 2022-06-21 NOTE — TELEPHONE ENCOUNTER
Specialty Pharmacy - Refill Coordination    Specialty Medication Orders Linked to Encounter    Flowsheet Row Most Recent Value   Medication #1 secukinumab (COSENTYX PEN) 150 mg/mL PnIj (Order#[id number], Rx#[id number])          Refill Questions - Documented Responses    Flowsheet Row Most Recent Value   Patient Availability and HIPAA Verification    Does patient want to proceed with activity? Yes   HIPAA/medical authority confirmed? Yes   Relationship to patient of person spoken to? Self   Refill Screening Questions    Changes to allergies? No   Changes to medications? No   New conditions since last clinic visit? No   Unplanned office visit, urgent care, ED, or hospital admission in the last 4 weeks? No   How does patient/caregiver feel medication is working? Very good   Financial problems or insurance changes? No   How many doses of your specialty medications were missed in the last 4 weeks? 0   Would patient like to speak to a pharmacist? No   When does the patient need to receive the medication? 06/23/22   Refill Delivery Questions    How will the patient receive the medication? Mail   When does the patient need to receive the medication? 06/23/22   Shipping Address Home   Address in [facility] confirmed and updated if neccessary? Yes   Expected Copay ($) 0   Is the patient able to afford the medication copay? Yes   Payment Method zero copay   Days supply of Refill 28   Supplies needed? No supplies needed   Refill activity completed? Yes   Refill activity plan Refill scheduled   Shipment/Pickup Date: 06/21/22          Current Outpatient Medications   Medication Sig    ACCU-CHEK FRIEDA PLUS METER Misc Inject 1 each into the skin 2 (two) times a day.    ACCU-CHEK FRIEDA PLUS TEST STRP Strp USE FOUR TIMES DAILY    acetaminophen (TYLENOL) 325 MG tablet Take 2 tablets (650 mg total) by mouth every 6 (six) hours as needed.    ammonium lactate 12 % Crea APPLY EVERY DAY TO FEET    aspirin (ECOTRIN) 81 MG EC  "tablet Take 1 tablet (81 mg total) by mouth once daily.    atorvastatin (LIPITOR) 80 MG tablet TAKE 1 TABLET(80 MG) BY MOUTH EVERY EVENING    BD ULTRA-FINE SHORT PEN NEEDLE 31 gauge x 5/16" Ndle U UTD    blood sugar diagnostic (ACCU-CHEK FRIEDA PLUS TEST STRP) Strp 1 strip by Misc.(Non-Drug; Combo Route) route 2 (two) times daily before meals. Dx Code E 11.9   DXT BID   IDDM  Accu-Chek Frieda Pluse Test Strips    blood sugar diagnostic Strp 1 each by Misc.(Non-Drug; Combo Route) route 4 (four) times daily.    carvediloL (COREG) 12.5 MG tablet Take 2 tablets (25 mg total) by mouth 2 (two) times daily with meals.    clopidogreL (PLAVIX) 75 mg tablet Take 1 tablet (75 mg total) by mouth once daily.    diclofenac sodium (VOLTAREN) 1 % Gel Apply 2 g topically once daily.    dulaglutide (TRULICITY) 0.75 mg/0.5 mL pen injector Inject 0.75 mg into the skin every 7 days.    DULoxetine (CYMBALTA) 30 MG capsule Take 1 capsule (30 mg total) by mouth once daily.    famotidine (PEPCID) 20 MG tablet Take 1 tablet (20 mg total) by mouth 2 (two) times daily.    furosemide (LASIX) 40 MG tablet Take 1 tablet (40 mg total) by mouth once daily.    gabapentin (NEURONTIN) 300 MG capsule Take 1 capsule (300 mg total) by mouth 3 (three) times daily.    insulin aspart U-100 (NOVOLOG) 100 unit/mL injection Inject 10 Units into the skin 3 (three) times daily.    insulin glargine (LANTUS U-100 INSULIN) 100 unit/mL injection Inject 25 Units into the skin 2 (two) times daily.    isosorbide mononitrate (IMDUR) 30 MG 24 hr tablet Take 2 tablets (60 mg total) by mouth once daily.    lancing device Misc 1 each by Misc.(Non-Drug; Combo Route) route 4 (four) times daily.    LIDOcaine (LIDODERM) 5 % Place 1 patch onto the skin once daily. Remove & Discard patch within 12 hours or as directed by MD    lisinopriL (PRINIVIL,ZESTRIL) 40 MG tablet Take 1 tablet (40 mg total) by mouth once daily.    MICRO THIN LANCETS 33 gauge Misc Inject 300 " lancets into the skin 2 (two) times a day.    NIFEdipine (PROCARDIA XL) 60 MG (OSM) 24 hr tablet Take 1 tablet (60 mg total) by mouth 2 (two) times a day.    nitroGLYCERIN (NITROSTAT) 0.4 MG SL tablet Place 1 tablet (0.4 mg total) under the tongue every 5 (five) minutes as needed for Chest pain.    secukinumab (COSENTYX PEN) 150 mg/mL PnIj Inject 150 mg into the skin every 28 days.    secukinumab (COSENTYX PEN) 150 mg/mL PnIj Inject 150 mg into the skin every 28 days.    SUPER THIN LANCETS 28 gauge Misc USE AS DIRECTED FOUR TIMES DAILY    timolol maleate 0.5% (TIMOPTIC) 0.5 % Drop Place 1 drop into both eyes 2 (two) times daily.    triamcinolone (KENALOG) 0.5 % ointment Apply topically 3 (three) times daily.   Last reviewed on 6/7/2022  1:01 PM by Tony Lai LPN    Review of patient's allergies indicates:  No Known Allergies Last reviewed on  6/7/2022 1:01 PM by Tony Lai      Tasks added this encounter   7/19/2022 - Refill Call (Auto Added)   Tasks due within next 3 months   No tasks due.     Grtechen Melo, PharmD  [facility]  [address] [place] [address]  Phone: [phone]  Fax: [phone]

## 2022-07-08 ENCOUNTER — SPECIALTY PHARMACY (OUTPATIENT)
Dept: PHARMACY | Facility: CLINIC | Age: 67
End: 2022-07-08

## 2022-07-08 NOTE — TELEPHONE ENCOUNTER
Specialty Pharmacy - Clinical Intervention  Specialty Pharmacy - Refill Coordination    Specialty Medication Orders Linked to Encounter    Flowsheet Row Most Recent Value   Medication #1 secukinumab (COSENTYX PEN) 150 mg/mL PnIj (Order#[id number], Rx#)          Refill Questions - Documented Responses    Flowsheet Row Most Recent Value   Patient Availability and HIPAA Verification    Does patient want to proceed with activity? Yes   HIPAA/medical authority confirmed? Yes   Relationship to patient of person spoken to? Self   Refill Screening Questions    Changes to allergies? No   Changes to medications? No   New conditions since last clinic visit? No   Unplanned office visit, urgent care, ED, or hospital admission in the last 4 weeks? No   How does patient/caregiver feel medication is working? Poor   Financial problems or insurance changes? No   How many doses of your specialty medications were missed in the last 4 weeks? 0   Would patient like to speak to a pharmacist? No   When does the patient need to receive the medication? 07/13/22   Refill Delivery Questions    How will the patient receive the medication? Mail   When does the patient need to receive the medication? 07/13/22   Shipping Address Home   Address in [facility] confirmed and updated if neccessary? Yes   Expected Copay ($) 0   Is the patient able to afford the medication copay? Yes   Payment Method zero copay   Days supply of Refill 28   Supplies needed? No supplies needed   Refill activity completed? Yes   Refill activity plan Refill scheduled   Shipment/Pickup Date: 07/12/22          Current Outpatient Medications   Medication Sig    ACCU-CHEK FRIEDA PLUS METER Misc Inject 1 each into the skin 2 (two) times a day.    ACCU-CHEK FRIEDA PLUS TEST STRP Strp USE FOUR TIMES DAILY    acetaminophen (TYLENOL) 325 MG tablet Take 2 tablets (650 mg total) by mouth every 6 (six) hours as needed.    amitriptyline (ELAVIL) 50 MG tablet     ammonium lactate  "12 % Crea APPLY EVERY DAY TO FEET    aspirin (ECOTRIN) 81 MG EC tablet Take 1 tablet (81 mg total) by mouth once daily.    atorvastatin (LIPITOR) 80 MG tablet TAKE 1 TABLET(80 MG) BY MOUTH EVERY EVENING    BD ULTRA-FINE SHORT PEN NEEDLE 31 gauge x 5/16" Ndle U UTD    blood sugar diagnostic (ACCU-CHEK FRIEDA PLUS TEST STRP) Strp 1 strip by Misc.(Non-Drug; Combo Route) route 2 (two) times daily before meals. Dx Code E 11.9   DXT BID   IDDM  Accu-Chek Frieda Pluse Test Strips    blood sugar diagnostic Strp 1 each by Misc.(Non-Drug; Combo Route) route 4 (four) times daily.    carvediloL (COREG) 12.5 MG tablet Take 2 tablets (25 mg total) by mouth 2 (two) times daily with meals.    clopidogreL (PLAVIX) 75 mg tablet Take 1 tablet (75 mg total) by mouth once daily.    diclofenac sodium (VOLTAREN) 1 % Gel Apply 2 g topically once daily.    dulaglutide (TRULICITY) 0.75 mg/0.5 mL pen injector Inject 0.75 mg into the skin every 7 days.    DULoxetine (CYMBALTA) 30 MG capsule Take 1 capsule (30 mg total) by mouth once daily.    famotidine (PEPCID) 20 MG tablet Take 1 tablet (20 mg total) by mouth 2 (two) times daily.    furosemide (LASIX) 40 MG tablet Take 1 tablet (40 mg total) by mouth once daily.    gabapentin (NEURONTIN) 300 MG capsule Take 1 capsule (300 mg total) by mouth 3 (three) times daily.    insulin aspart U-100 (NOVOLOG) 100 unit/mL injection Inject 10 Units into the skin 3 (three) times daily.    insulin glargine (LANTUS U-100 INSULIN) 100 unit/mL injection Inject 25 Units into the skin 2 (two) times daily.    isosorbide mononitrate (IMDUR) 30 MG 24 hr tablet Take 2 tablets (60 mg total) by mouth once daily.    lancing device Misc 1 each by Misc.(Non-Drug; Combo Route) route 4 (four) times daily.    LIDOcaine (LIDODERM) 5 % Place 1 patch onto the skin once daily. Remove & Discard patch within 12 hours or as directed by MD    lisinopriL (PRINIVIL,ZESTRIL) 40 MG tablet Take 1 tablet (40 mg total) by " mouth once daily.    MICRO THIN LANCETS 33 gauge Misc Inject 300 lancets into the skin 2 (two) times a day.    NIFEdipine (PROCARDIA XL) 60 MG (OSM) 24 hr tablet Take 1 tablet (60 mg total) by mouth 2 (two) times a day.    nitroGLYCERIN (NITROSTAT) 0.4 MG SL tablet Place 1 tablet (0.4 mg total) under the tongue every 5 (five) minutes as needed for Chest pain.    secukinumab (COSENTYX PEN) 150 mg/mL PnIj Inject 150 mg into the skin every 28 days.    secukinumab (COSENTYX PEN) 150 mg/mL PnIj Inject 300 mg into the skin every 28 days.    SUPER THIN LANCETS 28 gauge Misc USE AS DIRECTED FOUR TIMES DAILY    timolol maleate 0.5% (TIMOPTIC) 0.5 % Drop Place 1 drop into both eyes 2 (two) times daily.    triamcinolone (KENALOG) 0.5 % ointment Apply topically 3 (three) times daily.   Last reviewed on 7/5/2022 10:41 AM by Skyla Carl MA    Review of patient's allergies indicates:  No Known Allergies Last reviewed on  7/5/2022 10:40 AM by Skyla Carl    Spoke with patient about change in therapy dosage. Explained that she will now be taking 2 pens injections in one day every month. Patient stated they increased her dosage because she started flaring again.   Tasks added this encounter   No tasks added.   Tasks due within next 3 months   7/19/2022 - Refill Call (Auto Added)     Ann Ball, PharmD  [facility]  [address] [place] [id number]  Phone: [phone]  Fax: [phone]

## 2022-07-29 ENCOUNTER — SPECIALTY PHARMACY (OUTPATIENT)
Dept: PHARMACY | Facility: CLINIC | Age: 67
End: 2022-07-29

## 2022-07-29 NOTE — TELEPHONE ENCOUNTER
Specialty Pharmacy - Refill Coordination    Specialty Medication Orders Linked to Encounter    Flowsheet Row Most Recent Value   Medication #1 secukinumab (COSENTYX PEN) 150 mg/mL PnIj (Order#[id number], Rx#[id number])          Refill Questions - Documented Responses    Flowsheet Row Most Recent Value   Patient Availability and HIPAA Verification    Does patient want to proceed with activity? Yes   HIPAA/medical authority confirmed? Yes   Relationship to patient of person spoken to? Self   Refill Screening Questions    Changes to allergies? No   Changes to medications? No   New conditions since last clinic visit? No   Unplanned office visit, urgent care, ED, or hospital admission in the last 4 weeks? No   How does patient/caregiver feel medication is working? Good   Financial problems or insurance changes? No   How many doses of your specialty medications were missed in the last 4 weeks? 0   Would patient like to speak to a pharmacist? No   When does the patient need to receive the medication? 08/02/22   Refill Delivery Questions    How will the patient receive the medication? Mail   When does the patient need to receive the medication? 08/02/22   Shipping Address Home   Address in [facility] confirmed and updated if neccessary? Yes   Expected Copay ($) 0   Is the patient able to afford the medication copay? Yes   Payment Method zero copay   Days supply of Refill 28   Supplies needed? No supplies needed   Refill activity completed? Yes   Refill activity plan Refill scheduled   Shipment/Pickup Date: 08/01/22          Current Outpatient Medications   Medication Sig    ACCU-CHEK FRIEDA PLUS METER Misc Inject 1 each into the skin 2 (two) times a day.    ACCU-CHEK FRIEDA PLUS TEST STRP Strp USE FOUR TIMES DAILY    acetaminophen (TYLENOL) 325 MG tablet Take 2 tablets (650 mg total) by mouth every 6 (six) hours as needed.    amitriptyline (ELAVIL) 50 MG tablet     ammonium lactate 12 % Crea APPLY EVERY DAY TO  "FEET    aspirin (ECOTRIN) 81 MG EC tablet Take 1 tablet (81 mg total) by mouth once daily.    atorvastatin (LIPITOR) 80 MG tablet TAKE 1 TABLET(80 MG) BY MOUTH EVERY EVENING    BD ULTRA-FINE SHORT PEN NEEDLE 31 gauge x 5/16" Ndle U UTD    blood sugar diagnostic (ACCU-CHEK FRIEDA PLUS TEST STRP) Strp 1 strip by Misc.(Non-Drug; Combo Route) route 2 (two) times daily before meals. Dx Code E 11.9   DXT BID   IDDM  Accu-Chek Frieda Pluse Test Strips    blood sugar diagnostic Strp 1 each by Misc.(Non-Drug; Combo Route) route 4 (four) times daily.    carvediloL (COREG) 12.5 MG tablet Take 2 tablets (25 mg total) by mouth 2 (two) times daily with meals.    clopidogreL (PLAVIX) 75 mg tablet Take 1 tablet (75 mg total) by mouth once daily.    diclofenac sodium (VOLTAREN) 1 % Gel Apply 2 g topically once daily.    dulaglutide (TRULICITY) 0.75 mg/0.5 mL pen injector Inject 0.75 mg into the skin every 7 days.    DULoxetine (CYMBALTA) 30 MG capsule Take 1 capsule (30 mg total) by mouth once daily.    famotidine (PEPCID) 20 MG tablet Take 1 tablet (20 mg total) by mouth 2 (two) times daily.    furosemide (LASIX) 40 MG tablet Take 1 tablet (40 mg total) by mouth once daily.    gabapentin (NEURONTIN) 300 MG capsule Take 1 capsule (300 mg total) by mouth 3 (three) times daily.    insulin aspart U-100 (NOVOLOG) 100 unit/mL injection Inject 10 Units into the skin 3 (three) times daily.    insulin glargine (LANTUS U-100 INSULIN) 100 unit/mL injection Inject 25 Units into the skin 2 (two) times daily.    isosorbide mononitrate (IMDUR) 30 MG 24 hr tablet Take 2 tablets (60 mg total) by mouth once daily.    lancing device Misc 1 each by Misc.(Non-Drug; Combo Route) route 4 (four) times daily.    LIDOcaine (LIDODERM) 5 % Place 1 patch onto the skin once daily. Remove & Discard patch within 12 hours or as directed by MD    lisinopriL (PRINIVIL,ZESTRIL) 40 MG tablet Take 1 tablet (40 mg total) by mouth once daily.    MICRO " THIN LANCETS 33 gauge Misc Inject 300 lancets into the skin 2 (two) times a day.    NIFEdipine (PROCARDIA XL) 60 MG (OSM) 24 hr tablet Take 1 tablet (60 mg total) by mouth 2 (two) times a day.    nitroGLYCERIN (NITROSTAT) 0.4 MG SL tablet Place 1 tablet (0.4 mg total) under the tongue every 5 (five) minutes as needed for Chest pain.    secukinumab (COSENTYX PEN) 150 mg/mL PnIj Inject 300 mg into the skin every 28 days.    SUPER THIN LANCETS 28 gauge Misc USE AS DIRECTED FOUR TIMES DAILY    timolol maleate 0.5% (TIMOPTIC) 0.5 % Drop Place 1 drop into both eyes 2 (two) times daily.    triamcinolone (KENALOG) 0.5 % ointment Apply topically 3 (three) times daily.   Last reviewed on 7/5/2022 10:41 AM by Skyla Carl MA    Review of patient's allergies indicates:  No Known Allergies Last reviewed on  7/5/2022 10:40 AM by Skyla Carl      Tasks added this encounter   8/23/2022 - Refill Call (Auto Added)   Tasks due within next 3 months   No tasks due.     [facility]  [address] [facility] [address]  Phone: [phone]  Fax: [phone]

## 2022-08-02 PROBLEM — M79.622 LEFT UPPER ARM PAIN: Status: ACTIVE | Noted: 2022-08-02

## 2022-08-02 PROBLEM — R30.0 DYSURIA: Status: ACTIVE | Noted: 2022-08-02

## 2022-08-23 ENCOUNTER — SPECIALTY PHARMACY (OUTPATIENT)
Dept: PHARMACY | Facility: CLINIC | Age: 67
End: 2022-08-23

## 2022-08-23 NOTE — TELEPHONE ENCOUNTER
Specialty Pharmacy - Refill Coordination    Specialty Medication Orders Linked to Encounter    Flowsheet Row Most Recent Value   Medication #1 secukinumab (COSENTYX PEN) 150 mg/mL PnIj (Order#[id number], Rx#[id number])          Refill Questions - Documented Responses    Flowsheet Row Most Recent Value   Patient Availability and HIPAA Verification    Does patient want to proceed with activity? Yes   HIPAA/medical authority confirmed? Yes   Relationship to patient of person spoken to? Self   Refill Screening Questions    Changes to allergies? No   Changes to medications? No   New conditions since last clinic visit? No   Unplanned office visit, urgent care, ED, or hospital admission in the last 4 weeks? No   How does patient/caregiver feel medication is working? Good   Financial problems or insurance changes? No   How many doses of your specialty medications were missed in the last 4 weeks? 0   Would patient like to speak to a pharmacist? No   When does the patient need to receive the medication? 08/31/22   Refill Delivery Questions    How will the patient receive the medication? Mail   When does the patient need to receive the medication? 08/31/22   Shipping Address Home   Address in [facility] confirmed and updated if neccessary? Yes   Expected Copay ($) 0   Is the patient able to afford the medication copay? Yes   Payment Method zero copay   Days supply of Refill 28   Supplies needed? No supplies needed   Refill activity completed? Yes   Refill activity plan Refill scheduled   Shipment/Pickup Date: 08/29/22          Current Outpatient Medications   Medication Sig    ACCU-CHEK FRIEDA PLUS METER Misc Inject 1 each into the skin 2 (two) times a day.    acetaminophen (TYLENOL) 325 MG tablet Take 2 tablets (650 mg total) by mouth every 6 (six) hours as needed.    amitriptyline (ELAVIL) 50 MG tablet     ammonium lactate 12 % Crea APPLY EVERY DAY TO FEET    aspirin (ECOTRIN) 81 MG EC tablet Take 1 tablet (81 mg  "total) by mouth once daily.    atorvastatin (LIPITOR) 80 MG tablet TAKE 1 TABLET(80 MG) BY MOUTH EVERY EVENING    BD ULTRA-FINE SHORT PEN NEEDLE 31 gauge x 5/16" Ndle U UTD    blood sugar diagnostic (ACCU-CHEK FRIEDA PLUS TEST STRP) Strp 1 strip by Misc.(Non-Drug; Combo Route) route 2 (two) times daily before meals. Dx Code E 11.9   DXT BID   IDDM  Accu-Chek Frieda Pluse Test Strips    blood sugar diagnostic (ACCU-CHEK FRIEDA PLUS TEST STRP) Strp USE FOUR TIMES DAILY    blood sugar diagnostic Strp 1 each by Misc.(Non-Drug; Combo Route) route 4 (four) times daily.    carvediloL (COREG) 12.5 MG tablet Take 2 tablets (25 mg total) by mouth 2 (two) times daily with meals.    clopidogreL (PLAVIX) 75 mg tablet Take 1 tablet (75 mg total) by mouth once daily.    diclofenac sodium (VOLTAREN) 1 % Gel Apply 2 g topically once daily.    dulaglutide (TRULICITY) 0.75 mg/0.5 mL pen injector Inject 0.75 mg into the skin every 7 days.    DULoxetine (CYMBALTA) 30 MG capsule Take 1 capsule (30 mg total) by mouth once daily.    famotidine (PEPCID) 20 MG tablet Take 1 tablet (20 mg total) by mouth 2 (two) times daily.    furosemide (LASIX) 40 MG tablet Take 1 tablet (40 mg total) by mouth once daily.    gabapentin (NEURONTIN) 300 MG capsule Take 1 capsule (300 mg total) by mouth 3 (three) times daily.    insulin glargine (LANTUS U-100 INSULIN) 100 unit/mL injection Inject 25 Units into the skin 2 (two) times daily.    lancing device Misc 1 each by Misc.(Non-Drug; Combo Route) route 4 (four) times daily.    LIDOcaine (LIDODERM) 5 % Place 1 patch onto the skin once daily. Remove & Discard patch within 12 hours or as directed by MD    lisinopriL (PRINIVIL,ZESTRIL) 40 MG tablet Take 1 tablet (40 mg total) by mouth once daily.    MICRO THIN LANCETS 33 gauge Misc Inject 300 lancets into the skin 2 (two) times a day.    NIFEdipine (PROCARDIA XL) 60 MG (OSM) 24 hr tablet Take 1 tablet (60 mg total) by mouth 2 (two) times a day. "    nitroGLYCERIN (NITROSTAT) 0.4 MG SL tablet Place 1 tablet (0.4 mg total) under the tongue every 5 (five) minutes as needed for Chest pain.    secukinumab (COSENTYX PEN) 150 mg/mL PnIj Inject 300 mg into the skin every 28 days.    SUPER THIN LANCETS 28 gauge Misc USE AS DIRECTED FOUR TIMES DAILY    timolol maleate 0.5% (TIMOPTIC) 0.5 % Drop Place 1 drop into both eyes 2 (two) times daily.    triamcinolone (KENALOG) 0.5 % ointment Apply topically 3 (three) times daily.   Last reviewed on 8/16/2022 10:27 AM by Jaimie Garcia MA    Review of patient's allergies indicates:  No Known Allergies Last reviewed on  8/16/2022 10:25 AM by Jaimie Garcia      Tasks added this encounter   9/21/2022 - Refill Call (Auto Added)   Tasks due within next 3 months   No tasks due.     [facility]  [address] [place] [id number]  Phone: [phone]  Fax: [phone]

## 2022-09-21 ENCOUNTER — SPECIALTY PHARMACY (OUTPATIENT)
Dept: PHARMACY | Facility: CLINIC | Age: 67
End: 2022-09-21

## 2022-09-21 NOTE — TELEPHONE ENCOUNTER
Specialty Pharmacy - Refill Coordination    Specialty Medication Orders Linked to Encounter      Flowsheet Row Most Recent Value   Medication #1 secukinumab (COSENTYX PEN) 150 mg/mL PnIj (Order#[id number], Rx#[id number])            Refill Questions - Documented Responses      Flowsheet Row Most Recent Value   Patient Availability and HIPAA Verification    Does patient want to proceed with activity? Yes   HIPAA/medical authority confirmed? Yes   Relationship to patient of person spoken to? Self   Refill Screening Questions    Changes to allergies? No   Changes to medications? No   New conditions since last clinic visit? No   Unplanned office visit, urgent care, ED, or hospital admission in the last 4 weeks? No   How does patient/caregiver feel medication is working? Very good   Financial problems or insurance changes? No   How many doses of your specialty medications were missed in the last 4 weeks? 0   Would patient like to speak to a pharmacist? No   When does the patient need to receive the medication? 09/28/22   Refill Delivery Questions    How will the patient receive the medication? Mail   When does the patient need to receive the medication? 09/28/22   Shipping Address Home   Address in [facility] confirmed and updated if neccessary? Yes   Expected Copay ($) 0   Is the patient able to afford the medication copay? Yes   Payment Method zero copay   Days supply of Refill 28   Supplies needed? No supplies needed   Refill activity completed? Yes   Refill activity plan Refill scheduled   Shipment/Pickup Date: 09/27/22            Current Outpatient Medications   Medication Sig    ACCU-CHEK FRIEDA PLUS METER Misc Inject 1 each into the skin 2 (two) times a day.    acetaminophen (TYLENOL) 325 MG tablet Take 2 tablets (650 mg total) by mouth every 6 (six) hours as needed.    amitriptyline (ELAVIL) 50 MG tablet     ammonium lactate 12 % Crea APPLY EVERY DAY TO FEET    aspirin (ECOTRIN) 81 MG EC tablet Take 1 tablet  "(81 mg total) by mouth once daily.    atorvastatin (LIPITOR) 80 MG tablet TAKE 1 TABLET(80 MG) BY MOUTH EVERY EVENING    BD ULTRA-FINE SHORT PEN NEEDLE 31 gauge x 5/16" Ndle U UTD    blood sugar diagnostic (ACCU-CHEK FRIEDA PLUS TEST STRP) Strp 1 strip by Misc.(Non-Drug; Combo Route) route 2 (two) times daily before meals. Dx Code E 11.9   DXT BID   IDDM  Accu-Chek Frieda Pluse Test Strips    blood sugar diagnostic (ACCU-CHEK FRIEDA PLUS TEST STRP) Strp USE FOUR TIMES DAILY    blood sugar diagnostic Strp 1 each by Misc.(Non-Drug; Combo Route) route 4 (four) times daily.    carvediloL (COREG) 12.5 MG tablet Take 2 tablets (25 mg total) by mouth 2 (two) times daily with meals.    clopidogreL (PLAVIX) 75 mg tablet Take 1 tablet (75 mg total) by mouth once daily.    diclofenac sodium (VOLTAREN) 1 % Gel Apply 2 g topically once daily.    dulaglutide (TRULICITY) 0.75 mg/0.5 mL pen injector Inject 0.75 mg into the skin every 7 days.    DULoxetine (CYMBALTA) 30 MG capsule Take 1 capsule (30 mg total) by mouth once daily.    famotidine (PEPCID) 20 MG tablet Take 1 tablet (20 mg total) by mouth 2 (two) times daily.    fluconazole (DIFLUCAN) 150 MG Tab Take 1 tablet (150 mg total) by mouth every 72 hours. for 2 doses    furosemide (LASIX) 40 MG tablet Take 1 tablet (40 mg total) by mouth once daily.    gabapentin (NEURONTIN) 300 MG capsule Take 1 capsule (300 mg total) by mouth 3 (three) times daily.    insulin glargine (LANTUS U-100 INSULIN) 100 unit/mL injection Inject 25 Units into the skin 2 (two) times daily.    lancing device Misc 1 each by Misc.(Non-Drug; Combo Route) route 4 (four) times daily.    LIDOcaine (LIDODERM) 5 % Place 1 patch onto the skin once daily. Remove & Discard patch within 12 hours or as directed by MD    lisinopriL (PRINIVIL,ZESTRIL) 40 MG tablet Take 1 tablet (40 mg total) by mouth once daily.    MICRO THIN LANCETS 33 gauge Misc Inject 300 lancets into the skin 2 (two) times a day.    NIFEdipine " (PROCARDIA XL) 60 MG (OSM) 24 hr tablet Take 1 tablet (60 mg total) by mouth 2 (two) times a day.    nitroGLYCERIN (NITROSTAT) 0.4 MG SL tablet Place 1 tablet (0.4 mg total) under the tongue every 5 (five) minutes as needed for Chest pain.    secukinumab (COSENTYX PEN) 150 mg/mL PnIj Inject 300 mg into the skin every 28 days.    SUPER THIN LANCETS 28 gauge Misc USE AS DIRECTED FOUR TIMES DAILY    timolol maleate 0.5% (TIMOPTIC) 0.5 % Drop Place 1 drop into both eyes 2 (two) times daily.    triamcinolone (KENALOG) 0.5 % ointment Apply topically 3 (three) times daily.   Last reviewed on 9/13/2022 12:04 PM by Kristin Beck MA    Review of patient's allergies indicates:  No Known Allergies Last reviewed on  9/19/2022 7:04 PM by Anyi Rosenthal      Tasks added this encounter   10/19/2022 - Refill Call (Auto Added)   Tasks due within next 3 months   No tasks due.     Vinicio Powell [facility]  [address] [place] [address]  Phone: [phone]  Fax: [phone]

## 2022-10-05 ENCOUNTER — SPECIALTY PHARMACY (OUTPATIENT)
Dept: PHARMACY | Facility: CLINIC | Age: 67
End: 2022-10-05

## 2022-10-05 NOTE — TELEPHONE ENCOUNTER
Cosentyx rx d/c'd. Provider switching to Stelara. Messaging provider to clarify dose. Will continue to follow up.     Kate, this is Oleg Mckeon with [facility].  We are working on your prescription that your doctor has sent us. We will be working with your insurance to get this approved for you. We will be calling you along the way with updates on your medication.  If you have any questions, you can reach us at [phone].    Welcome call outcome: Patient/caregiver reached

## 2022-10-07 NOTE — TELEPHONE ENCOUNTER
Stelara dose clarified. Submitted Stelara prior auth via [employer]. Key: [id number]. Will continue to follow up.

## 2022-10-10 NOTE — TELEPHONE ENCOUNTER
Santiago MERRILL approved until 12/31/23.    BI complete   Rx [employer]     Copay $0   ( LIS Level 2 )  Pt is in catastrophic phase.    [facility] in network    FA not required.      Order has been d/c'ed in [facility].  Sending message to MDO to have new order sent.

## 2022-10-24 ENCOUNTER — SPECIALTY PHARMACY (OUTPATIENT)
Dept: PHARMACY | Facility: CLINIC | Age: 67
End: 2022-10-24

## 2022-10-24 DIAGNOSIS — L40.50 PSORIATIC ARTHRITIS: Primary | ICD-10-CM

## 2022-10-27 NOTE — TELEPHONE ENCOUNTER
Specialty Pharmacy - Initial Clinical Assessment    Specialty Medication Orders Linked to Encounter      Flowsheet Row Most Recent Value   Medication #1 ustekinumab (STELARA) 90 mg/mL Syrg syringe (Order#[id number], Rx#[id number])          Patient Diagnosis   L40.50 - Psoriatic arthritis    Subjective    Giana Arnold is a 67 y.o. female, who is followed by the specialty pharmacy service for management and education.    Recent Encounters       Date Type Provider Description    10/24/2022 Specialty Pharmacy Padmini Clinton PharmD Initial Clinical Assessment    10/05/2022 Specialty Pharmacy Oleg Mckeon, Meg Referral Authorization    09/21/2022 Specialty Pharmacy Vinicio Yañez, Meg Refill Coordination    08/23/2022 Specialty Pharmacy Dagmar Arthur-Doug Refill Coordination    07/29/2022 Specialty Pharmacy Anthony GARCIA Resor Refill Coordination          Clinical call attempts since last clinical assessment   10/24/2022  7:19 PM - Specialty Pharmacy - Clinical Assessment by Padmini Clinton PharmD     Current Outpatient Medications   Medication Sig    amitriptyline (ELAVIL) 50 MG tablet     ammonium lactate 12 % Crea APPLY EVERY DAY TO FEET    aspirin (ECOTRIN) 81 MG EC tablet Take 1 tablet (81 mg total) by mouth once daily.    atorvastatin (LIPITOR) 80 MG tablet TAKE 1 TABLET(80 MG) BY MOUTH EVERY EVENING    carvediloL (COREG) 12.5 MG tablet Take 2 tablets (25 mg total) by mouth 2 (two) times daily with meals.    clopidogreL (PLAVIX) 75 mg tablet Take 1 tablet (75 mg total) by mouth once daily.    diclofenac sodium (VOLTAREN) 1 % Gel Apply 2 g topically once daily.    dulaglutide (TRULICITY) 0.75 mg/0.5 mL pen injector Inject 0.75 mg into the skin every 7 days.    DULoxetine (CYMBALTA) 30 MG capsule Take 1 capsule (30 mg total) by mouth once daily.    famotidine (PEPCID) 20 MG tablet Take 1 tablet (20 mg total) by mouth 2 (two) times daily.    furosemide (LASIX) 40 MG tablet Take 1 tablet (40 mg total) by  "mouth once daily.    gabapentin (NEURONTIN) 300 MG capsule Take 1 capsule (300 mg total) by mouth 3 (three) times daily.    insulin glargine (LANTUS U-100 INSULIN) 100 unit/mL injection Inject 25 Units into the skin 2 (two) times daily.    LIDOcaine (LIDODERM) 5 % Place 1 patch onto the skin once daily. Remove & Discard patch within 12 hours or as directed by MD    lisinopriL (PRINIVIL,ZESTRIL) 40 MG tablet Take 1 tablet (40 mg total) by mouth once daily.    meloxicam (MOBIC) 7.5 MG tablet Take 1 tablet (7.5 mg total) by mouth once daily.    NIFEdipine (PROCARDIA XL) 60 MG (OSM) 24 hr tablet Take 1 tablet (60 mg total) by mouth 2 (two) times a day.    nitroGLYCERIN (NITROSTAT) 0.4 MG SL tablet Place 1 tablet (0.4 mg total) under the tongue every 5 (five) minutes as needed for Chest pain.    timolol maleate 0.5% (TIMOPTIC) 0.5 % Drop Place 1 drop into both eyes 2 (two) times daily.    triamcinolone (KENALOG) 0.5 % ointment Apply topically 3 (three) times daily.    ustekinumab (STELARA) 90 mg/mL Syrg syringe Inject 90 mg (1 ml) under the skin at week 0 and 4 then every 12 weeks thereafter    ACCU-CHEK FRIEDA PLUS METER Misc Inject 1 each into the skin 2 (two) times a day.    acetaminophen (TYLENOL) 325 MG tablet Take 2 tablets (650 mg total) by mouth every 6 (six) hours as needed.    BD ULTRA-FINE SHORT PEN NEEDLE 31 gauge x 5/16" Ndle U UTD    blood sugar diagnostic (ACCU-CHEK FRIEDA PLUS TEST STRP) Strp 1 strip by Misc.(Non-Drug; Combo Route) route 2 (two) times daily before meals. Dx Code E 11.9   DXT BID   IDDM  Accu-Chek Frieda Pluse Test Strips    blood sugar diagnostic (ACCU-CHEK FRIEDA PLUS TEST STRP) Strp USE FOUR TIMES DAILY    blood sugar diagnostic Strp 1 each by Misc.(Non-Drug; Combo Route) route 4 (four) times daily.    lancing device Misc 1 each by Misc.(Non-Drug; Combo Route) route 4 (four) times daily.    MICRO THIN LANCETS 33 gauge Misc Inject 300 lancets into the skin 2 (two) times a day.    SUPER THIN " LANCETS 28 gauge Misc USE AS DIRECTED FOUR TIMES DAILY    ustekinumab (STELARA) 90 mg/mL Syrg syringe Inject 1 mL (90 mg total) into the skin every 12 weeks.   Last reviewed on 10/27/2022  2:33 PM by Oleg Mckeon, PharmD    Review of patient's allergies indicates:  No Known AllergiesLast reviewed on  10/27/2022 2:31 PM by Oleg Mckeon    Drug Interactions    Clinically relevant drug interactions identified: no       Medication Adherence    Adherence tools used: calendar         Assessment Questions - Documented Responses      Flowsheet Row Most Recent Value   Assessment    Medication Reconciliation completed for patient Yes   During the past 4 weeks, has patient missed any activities due to condition or medication? No   During the past 4 weeks, did patient have any of the following urgent care visits? None   Goals of Therapy Status Discussed (new start)   Status of the patients ability to self-administer: Is Able   All education points have been covered with patient? Yes, supplemental printed education provided   Welcome packet contents reviewed and discussed with patient? Yes   Assesment completed? Yes   Plan Therapy being initiated   Do you need to open a clinical intervention (i-vent)? No   Do you want to schedule first shipment? Yes   Medication #1 Assessment Info    Patient status New medication, Exisiting to [facility]   Is this medication appropriate for the patient? Yes   Is this medication effective? Not yet started          Refill Questions - Documented Responses      Flowsheet Row Most Recent Value   Refill Screening Questions    When does the patient need to receive the medication? 11/01/22   Refill Delivery Questions    How will the patient receive the medication? Mail   When does the patient need to receive the medication? 11/01/22   Shipping Address Home   Address in [facility] confirmed and updated if neccessary? Yes   Expected Copay ($) 0   Is the patient able to afford the medication  "copay? Yes   Payment Method zero copay   Days supply of Refill 28   Supplies needed? No supplies needed   Refill activity completed? Yes   Refill activity plan Refill scheduled   Shipment/Pickup Date: 10/31/22            Objective    She has a past medical history of Cataract, Diabetes mellitus, Glaucoma, and Mild nonproliferative diabetic retinopathy of both eyes without macular edema associated with type 2 diabetes mellitus (05/22/2020).    Tried/failed medications: Enbrel, Humira, Cosentyx, Arava, and Methotrexate.     BP Readings from Last 4 Encounters:   10/04/22 (!) 142/92   09/13/22 137/74   08/16/22 121/67   08/02/22 (!) 143/75     Ht Readings from Last 4 Encounters:   10/04/22 5' 5" (1.651 m)   09/13/22 5' 5" (1.651 m)   08/16/22 5' 6" (1.676 m)   08/02/22 5' 6" (1.676 m)     Wt Readings from Last 4 Encounters:   10/04/22 113.4 kg (250 lb)   09/13/22 115.2 kg (254 lb)   08/16/22 117 kg (258 lb)   08/02/22 117.1 kg (258 lb 3.2 oz)     Recent Labs   Lab Result Units 10/04/22  1102   Creatinine mg/dL 1.30   ALT U/L 18   AST U/L 10     The goals of prescribed drug therapy management include:  Supporting patient to meet the prescriber's medical treatment objectives  Improving or maintaining quality of life  Maintaining optimal therapy adherence  Minimizing and managing side effects      Goals of Therapy Status: Discussed (new start)    Assessment/Plan  Patient plans to start therapy on 11/01/22      Indication, dosage, appropriateness, effectiveness, safety and convenience of her specialty medication(s) were reviewed today.     Patient Education   Patient received education on the following:   Expectations and possible outcomes of therapy  Proper use, timely administration, and missed dose management  Duration of therapy  Side effects, including prevention, minimization, and management  Contraindications and safety precautions  New or changed medications, including prescribe and over the counter medications and " supplements  Reviews recommended vaccinations, as appropriate  Storage, safe handling, and disposal      Tasks added this encounter   11/22/2022 - Refill Call (Auto Added)  7/27/2023 - Clinical - Follow Up Assesement (Annual)   Tasks due within next 3 months   No tasks due.     Oleg Mckeon, PharmD  [facility]  [address]  [facility] [address]  Phone: [phone]  Fax: [phone]

## 2022-11-15 PROBLEM — H66.92 LEFT OTITIS MEDIA: Status: ACTIVE | Noted: 2022-11-15

## 2022-11-23 ENCOUNTER — SPECIALTY PHARMACY (OUTPATIENT)
Dept: PHARMACY | Facility: CLINIC | Age: 67
End: 2022-11-23

## 2022-11-23 NOTE — TELEPHONE ENCOUNTER
Specialty Pharmacy - Refill Coordination    Specialty Medication Orders Linked to Encounter      Flowsheet Row Most Recent Value   Medication #1 ustekinumab (STELARA) 90 mg/mL Syrg syringe (Order#[id number], Rx#[id number])          Refill Questions - Documented Responses      Flowsheet Row Most Recent Value   Patient Availability and HIPAA Verification    Does patient want to proceed with activity? Yes   HIPAA/medical authority confirmed? Yes   Relationship to patient of person spoken to? Self   Refill Screening Questions    Changes to allergies? No   Changes to medications? No   New conditions since last clinic visit? No   Unplanned office visit, urgent care, ED, or hospital admission in the last 4 weeks? No   How does patient/caregiver feel medication is working? Good   Financial problems or insurance changes? No   How many doses of your specialty medications were missed in the last 4 weeks? 0   Would patient like to speak to a pharmacist? No   When does the patient need to receive the medication? 11/29/22   Refill Delivery Questions    How will the patient receive the medication? Mail   When does the patient need to receive the medication? 11/29/22   Shipping Address Home   Address in [facility] confirmed and updated if neccessary? Yes   Expected Copay ($) 0   Is the patient able to afford the medication copay? Yes   Payment Method zero copay   Days supply of Refill 84   Supplies needed? Injection Device   Refill activity completed? Yes   Refill activity plan Refill scheduled   Shipment/Pickup Date: 11/28/22            Current Outpatient Medications   Medication Sig    ACCU-CHEK FRIEDA PLUS METER Misc Inject 1 each into the skin 2 (two) times a day.    acetaminophen (TYLENOL) 325 MG tablet Take 2 tablets (650 mg total) by mouth every 6 (six) hours as needed.    amitriptyline (ELAVIL) 50 MG tablet     ammonium lactate 12 % Crea APPLY EVERY DAY TO FEET    aspirin (ECOTRIN) 81 MG EC tablet Take 1 tablet (81 mg  "total) by mouth once daily.    atorvastatin (LIPITOR) 80 MG tablet TAKE 1 TABLET(80 MG) BY MOUTH EVERY EVENING    BD ULTRA-FINE SHORT PEN NEEDLE 31 gauge x 5/16" Ndle U UTD    blood sugar diagnostic (ACCU-CHEK FRIEDA PLUS TEST STRP) Strp 1 strip by Misc.(Non-Drug; Combo Route) route 2 (two) times daily before meals. Dx Code E 11.9   DXT BID   IDDM  Accu-Chek Frieda Pluse Test Strips    blood sugar diagnostic (ACCU-CHEK FRIEDA PLUS TEST STRP) Strp USE FOUR TIMES DAILY    blood sugar diagnostic Strp 1 each by Misc.(Non-Drug; Combo Route) route 4 (four) times daily.    carvediloL (COREG) 12.5 MG tablet Take 2 tablets (25 mg total) by mouth 2 (two) times daily with meals.    clopidogreL (PLAVIX) 75 mg tablet Take 1 tablet (75 mg total) by mouth once daily.    diclofenac sodium (VOLTAREN) 1 % Gel Apply 2 g topically once daily.    DULoxetine (CYMBALTA) 30 MG capsule Take 1 capsule (30 mg total) by mouth once daily.    famotidine (PEPCID) 20 MG tablet Take 1 tablet (20 mg total) by mouth 2 (two) times daily.    furosemide (LASIX) 40 MG tablet Take 1 tablet (40 mg total) by mouth once daily.    gabapentin (NEURONTIN) 300 MG capsule Take 1 capsule (300 mg total) by mouth 3 (three) times daily.    insulin aspart U-100 (NOVOLOG) 100 unit/mL (3 mL) InPn pen Inject 5 Units into the skin 2 (two) times daily before meals.    insulin glargine (LANTUS U-100 INSULIN) 100 unit/mL injection Inject 25 Units into the skin 2 (two) times daily.    lancing device Misc 1 each by Misc.(Non-Drug; Combo Route) route 4 (four) times daily.    LIDOcaine (LIDODERM) 5 % Place 1 patch onto the skin once daily. Remove & Discard patch within 12 hours or as directed by MD    lisinopriL (PRINIVIL,ZESTRIL) 40 MG tablet Take 1 tablet (40 mg total) by mouth once daily.    meloxicam (MOBIC) 7.5 MG tablet Take 1 tablet (7.5 mg total) by mouth once daily.    MICRO THIN LANCETS 33 gauge Misc Inject 300 lancets into the skin 2 (two) times a day.    NIFEdipine " (PROCARDIA XL) 60 MG (OSM) 24 hr tablet Take 1 tablet (60 mg total) by mouth 2 (two) times a day.    nitroGLYCERIN (NITROSTAT) 0.4 MG SL tablet Place 1 tablet (0.4 mg total) under the tongue every 5 (five) minutes as needed for Chest pain.    SUPER THIN LANCETS 28 gauge Misc USE AS DIRECTED FOUR TIMES DAILY    timolol maleate 0.5% (TIMOPTIC) 0.5 % Drop Place 1 drop into both eyes 2 (two) times daily.    triamcinolone (KENALOG) 0.5 % ointment Apply topically 3 (three) times daily.    ustekinumab (STELARA) 90 mg/mL Syrg syringe Inject 90 mg (1 ml) under the skin at week 0 and 4 then every 12 weeks thereafter    ustekinumab (STELARA) 90 mg/mL Syrg syringe Inject 1 mL (90 mg total) into the skin every 12 weeks.   Last reviewed on 11/15/2022  1:40 PM by Norma Paul MD    Review of patient's allergies indicates:  No Known Allergies Last reviewed on  11/15/2022 1:40 PM by Norma Paul      Tasks added this encounter   2/14/2023 - Refill Call (Auto Added)   Tasks due within next 3 months   No tasks due.     Danny Cotto, PharmD  [facility]  [address] [place] [address]  Phone: [phone]  Fax: [phone]

## 2023-02-17 ENCOUNTER — SPECIALTY PHARMACY (OUTPATIENT)
Dept: PHARMACY | Facility: CLINIC | Age: 68
End: 2023-02-17

## 2023-02-17 NOTE — TELEPHONE ENCOUNTER
Specialty Pharmacy - Refill Coordination    Specialty Medication Orders Linked to Encounter      Flowsheet Row Most Recent Value   Medication #1 ustekinumab (STELARA) 90 mg/mL Syrg syringe (Order#[id number], Rx#[id number])            Refill Questions - Documented Responses      Flowsheet Row Most Recent Value   Patient Availability and HIPAA Verification    Does patient want to proceed with activity? Yes   HIPAA/medical authority confirmed? Yes   Relationship to patient of person spoken to? Self   Refill Screening Questions    Changes to allergies? No   Changes to medications? No   New conditions since last clinic visit? No   Unplanned office visit, urgent care, ED, or hospital admission in the last 4 weeks? No   How does patient/caregiver feel medication is working? Good   Financial problems or insurance changes? No   How many doses of your specialty medications were missed in the last 4 weeks? 0   Would patient like to speak to a pharmacist? No   When does the patient need to receive the medication? 02/21/23   Refill Delivery Questions    How will the patient receive the medication? Mail   When does the patient need to receive the medication? 02/21/23   Shipping Address Home   Address in [facility] confirmed and updated if neccessary? Yes   Expected Copay ($) 0   Is the patient able to afford the medication copay? Yes   Payment Method zero copay   Days supply of Refill 84   Supplies needed? No supplies needed   Refill activity completed? Yes   Refill activity plan Refill scheduled   Shipment/Pickup Date: 02/22/23            Current Outpatient Medications   Medication Sig    ACCU-CHEK FRIEDA PLUS METER Misc Inject 1 each into the skin 2 (two) times a day.    acetaminophen (TYLENOL) 325 MG tablet Take 2 tablets (650 mg total) by mouth every 6 (six) hours as needed.    amitriptyline (ELAVIL) 50 MG tablet     ammonium lactate 12 % Crea APPLY EVERY DAY TO FEET    aspirin (ECOTRIN) 81 MG EC tablet Take 1 tablet  "(81 mg total) by mouth once daily.    atorvastatin (LIPITOR) 80 MG tablet TAKE 1 TABLET(80 MG) BY MOUTH EVERY EVENING    BD ULTRA-FINE SHORT PEN NEEDLE 31 gauge x 5/16" Ndle U UTD    blood sugar diagnostic (ACCU-CHEK FRIEDA PLUS TEST STRP) Strp 1 strip by Misc.(Non-Drug; Combo Route) route 2 (two) times daily before meals. Dx Code E 11.9   DXT BID   IDDM  Accu-Chek Frieda Pluse Test Strips    blood sugar diagnostic (ACCU-CHEK FRIEDA PLUS TEST STRP) Strp USE FOUR TIMES DAILY    blood sugar diagnostic Strp 1 each by Misc.(Non-Drug; Combo Route) route 4 (four) times daily.    carvediloL (COREG) 12.5 MG tablet Take 2 tablets (25 mg total) by mouth 2 (two) times daily with meals.    clopidogreL (PLAVIX) 75 mg tablet Take 1 tablet (75 mg total) by mouth once daily.    diclofenac sodium (VOLTAREN) 1 % Gel Apply 2 g topically once daily.    DULoxetine (CYMBALTA) 30 MG capsule Take 1 capsule (30 mg total) by mouth once daily.    famotidine (PEPCID) 20 MG tablet Take 1 tablet (20 mg total) by mouth 2 (two) times daily.    furosemide (LASIX) 40 MG tablet Take 1 tablet (40 mg total) by mouth once daily.    gabapentin (NEURONTIN) 300 MG capsule Take 1 capsule (300 mg total) by mouth 3 (three) times daily.    insulin aspart U-100 (NOVOLOG) 100 unit/mL (3 mL) InPn pen Inject 5 Units into the skin 2 (two) times daily before meals.    insulin glargine (LANTUS U-100 INSULIN) 100 unit/mL injection Inject 25 Units into the skin 2 (two) times daily.    lancing device Misc 1 each by Misc.(Non-Drug; Combo Route) route 4 (four) times daily.    LIDOcaine (LIDODERM) 5 % Place 1 patch onto the skin once daily. Remove & Discard patch within 12 hours or as directed by MD    lisinopriL (PRINIVIL,ZESTRIL) 40 MG tablet Take 1 tablet (40 mg total) by mouth once daily.    meloxicam (MOBIC) 7.5 MG tablet Take 1 tablet (7.5 mg total) by mouth once daily.    MICRO THIN LANCETS 33 gauge Misc Inject 300 lancets into the skin 2 (two) times a day.    " NIFEdipine (PROCARDIA XL) 60 MG (OSM) 24 hr tablet Take 1 tablet (60 mg total) by mouth 2 (two) times a day.    nitroGLYCERIN (NITROSTAT) 0.4 MG SL tablet Place 1 tablet (0.4 mg total) under the tongue every 5 (five) minutes as needed for Chest pain.    SUPER THIN LANCETS 28 gauge Misc USE AS DIRECTED FOUR TIMES DAILY    timolol maleate 0.5% (TIMOPTIC) 0.5 % Drop Place 1 drop into both eyes 2 (two) times daily.    triamcinolone (KENALOG) 0.5 % ointment Apply topically 3 (three) times daily.    ustekinumab (STELARA) 90 mg/mL Syrg syringe Inject 1 mL (90 mg total) into the skin every 12 weeks.   Last reviewed on 11/15/2022  1:40 PM by Norma Paul MD    Review of patient's allergies indicates:  No Known Allergies Last reviewed on  11/15/2022 1:40 PM by Norma Paul      Tasks added this encounter   5/5/2023 - Refill Call (Auto Added)   Tasks due within next 3 months   No tasks due.     Lorena Powell [facility]  [address] [address]  Phone: [phone]  Fax: [phone]

## 2023-05-15 ENCOUNTER — SPECIALTY PHARMACY (OUTPATIENT)
Dept: PHARMACY | Facility: CLINIC | Age: 68
End: 2023-05-15

## 2023-05-15 NOTE — TELEPHONE ENCOUNTER
Specialty Pharmacy - Refill Coordination    Specialty Medication Orders Linked to Encounter      Flowsheet Row Most Recent Value   Medication #1 ustekinumab (STELARA) 90 mg/mL Syrg syringe (Order#[id number], Rx#[id number])            Refill Questions - Documented Responses      Flowsheet Row Most Recent Value   Patient Availability and HIPAA Verification    Does patient want to proceed with activity? Yes   HIPAA/medical authority confirmed? Yes   Relationship to patient of person spoken to? Self   Refill Screening Questions    Changes to allergies? No   Changes to medications? No   New conditions since last clinic visit? No   Unplanned office visit, urgent care, ED, or hospital admission in the last 4 weeks? No   How does patient/caregiver feel medication is working? Good   Financial problems or insurance changes? No   How many doses of your specialty medications were missed in the last 4 weeks? 0   Would patient like to speak to a pharmacist? No   When does the patient need to receive the medication? 05/17/23   Refill Delivery Questions    How will the patient receive the medication? Mail   When does the patient need to receive the medication? 05/17/23   Shipping Address Home   Address in [facility] confirmed and updated if neccessary? Yes   Expected Copay ($) 0   Is the patient able to afford the medication copay? Yes   Payment Method zero copay   Days supply of Refill 84   Supplies needed? No supplies needed   Refill activity completed? Yes   Refill activity plan Refill scheduled   Shipment/Pickup Date: 05/16/23            Current Outpatient Medications   Medication Sig    ACCU-CHEK FRIEDA PLUS METER Misc Inject 1 each into the skin 2 (two) times a day.    acetaminophen (TYLENOL) 325 MG tablet Take 2 tablets (650 mg total) by mouth every 6 (six) hours as needed.    amitriptyline (ELAVIL) 50 MG tablet     ammonium lactate 12 % Crea APPLY EVERY DAY TO FEET    aspirin (ECOTRIN) 81 MG EC tablet Take 1 tablet  "(81 mg total) by mouth once daily.    atorvastatin (LIPITOR) 80 MG tablet TAKE 1 TABLET(80 MG) BY MOUTH EVERY EVENING    BD ULTRA-FINE SHORT PEN NEEDLE 31 gauge x 5/16" Ndle U UTD    blood sugar diagnostic (ACCU-CHEK FRIEDA PLUS TEST STRP) Strp 1 strip by Misc.(Non-Drug; Combo Route) route 2 (two) times daily before meals. Dx Code E 11.9   DXT BID   IDDM  Accu-Chek Frieda Pluse Test Strips    blood sugar diagnostic (ACCU-CHEK FRIEDA PLUS TEST STRP) Strp USE FOUR TIMES DAILY    blood sugar diagnostic Strp 1 each by Misc.(Non-Drug; Combo Route) route 4 (four) times daily.    carvediloL (COREG) 12.5 MG tablet Take 2 tablets (25 mg total) by mouth 2 (two) times daily with meals.    clopidogreL (PLAVIX) 75 mg tablet Take 1 tablet (75 mg total) by mouth once daily.    diclofenac sodium (VOLTAREN) 1 % Gel Apply 2 g topically once daily.    DULoxetine (CYMBALTA) 30 MG capsule Take 1 capsule (30 mg total) by mouth once daily.    famotidine (PEPCID) 20 MG tablet Take 1 tablet (20 mg total) by mouth 2 (two) times daily.    furosemide (LASIX) 40 MG tablet Take 1 tablet (40 mg total) by mouth once daily.    gabapentin (NEURONTIN) 300 MG capsule Take 1 capsule (300 mg total) by mouth 3 (three) times daily.    insulin aspart U-100 (NOVOLOG) 100 unit/mL (3 mL) InPn pen Inject 5 Units into the skin 2 (two) times daily before meals.    insulin glargine (LANTUS U-100 INSULIN) 100 unit/mL injection Inject 25 Units into the skin 2 (two) times daily.    lancing device Misc 1 each by Misc.(Non-Drug; Combo Route) route 4 (four) times daily.    LIDOcaine (LIDODERM) 5 % Place 1 patch onto the skin once daily. Remove & Discard patch within 12 hours or as directed by MD    lisinopriL (PRINIVIL,ZESTRIL) 40 MG tablet Take 1 tablet (40 mg total) by mouth once daily.    meloxicam (MOBIC) 7.5 MG tablet Take 1 tablet (7.5 mg total) by mouth once daily.    MICRO THIN LANCETS 33 gauge Misc Inject 300 lancets into the skin 2 (two) times a day.    " NIFEdipine (PROCARDIA XL) 60 MG (OSM) 24 hr tablet Take 1 tablet (60 mg total) by mouth 2 (two) times a day.    nitroGLYCERIN (NITROSTAT) 0.4 MG SL tablet Place 1 tablet (0.4 mg total) under the tongue every 5 (five) minutes as needed for Chest pain.    SUPER THIN LANCETS 28 gauge Misc USE AS DIRECTED FOUR TIMES DAILY    timolol maleate 0.5% (TIMOPTIC) 0.5 % Drop Place 1 drop into both eyes 2 (two) times daily.    triamcinolone (KENALOG) 0.5 % ointment Apply topically 3 (three) times daily.    ustekinumab (STELARA) 90 mg/mL Syrg syringe Inject 1 mL (90 mg total) into the skin every 12 weeks.   Last reviewed on 11/15/2022  1:40 PM by Norma Paul MD    Review of patient's allergies indicates:  No Known Allergies Last reviewed on  11/15/2022 1:40 PM by Norma Paul      Tasks added this encounter   No tasks added.   Tasks due within next 3 months   5/18/2023 - Refill Coordination Outreach (1 time occurrence)  7/27/2023 - Clinical Assessment (1 year recurrence)     Tram Vasquez, PharmD  [facility]  [address] [facility] [address]  Phone: [phone]  Fax: [phone]

## 2023-08-01 ENCOUNTER — SPECIALTY PHARMACY (OUTPATIENT)
Dept: PHARMACY | Facility: CLINIC | Age: 68
End: 2023-08-01

## 2023-08-01 NOTE — TELEPHONE ENCOUNTER
Specialty Pharmacy - Clinical Reassessment    Specialty Medication Orders Linked to Encounter      Flowsheet Row Most Recent Value   Medication #1 ustekinumab (STELARA) 90 mg/mL Syrg syringe (Order#[id number], Rx#[id number])          Patient Diagnosis   L40.50 - Psoriatic arthritis    Giana Arnold is a 68 y.o. female, who is followed by the specialty pharmacy service for management and education of her PsA.  She has been on therapy with Stelara for 9 months.  I have reviewed her electronic medical record and current medication list and determined that specialty medication adjustment Is not needed at this time.    Patient has not experienced adverse events.  She Is adherent reporting 0 missed doses since last review.  Adherence has been encouraged with the following mechanism(s): proactive refill calls.  She is meeting goals of therapy and will continue treatment.        5/15/2023 2/17/2023 11/23/2022 10/27/2022 9/21/2022 8/23/2022 7/29/2022   Follow Up Review   # of missed doses 0 0 0  0 0 0   New Medications? No No No  No No No   New Conditions? No No No  No No No   New Allergies? No No No  No No No   Med Effective? Good Good Good  Very good Good Good   Missed activities?    No      Urgent Care? No No No  No No No   Requested Pharmacist? No No No  No No No         Therapy is appropriate to continue.    Therapy is effective: Yes  On scale of 1 to 10, how does patient rank quality of life? (10 - Best): Unable to Assess  Recommendations:  follow-up visit with rheum  Review Method: Chart Review    Tasks added this encounter   No tasks added.   Tasks due within next 3 months   7/27/2023 - Clinical Assessment (1 year recurrence)  8/4/2023 - Refill Coordination Outreach (1 time occurrence)     Siena Ramirez, PharmD  [facility]  [address] [facility] [address]  Phone: [phone]  Fax: [phone]

## 2023-08-21 ENCOUNTER — SPECIALTY PHARMACY (OUTPATIENT)
Dept: PHARMACY | Facility: CLINIC | Age: 68
End: 2023-08-21

## 2023-08-21 NOTE — TELEPHONE ENCOUNTER
Specialty Pharmacy - Refill Coordination    Specialty Medication Orders Linked to Encounter      Flowsheet Row Most Recent Value   Medication #1 ustekinumab (STELARA) 90 mg/mL Syrg syringe (Order#[id number], Rx#[id number])          Refill Questions - Documented Responses      Flowsheet Row Most Recent Value   Patient Availability and HIPAA Verification    Does patient want to proceed with activity? Unable to Reach          We have had multiple attempts to the patient and have been unsuccessful to reach the patient. We will stop reaching out to the patient but in the event that the patient needs the med and contacts us, we will communicate and begin dispensing for the patient. At your next visit with the patient, please review the importance of being in contact with our specialty pharmacy as a part of our care team.      Mae Ortiz, PharmD  [facility]  [address] [facility] [address]  Phone: [phone]  Fax: [phone]